# Patient Record
Sex: FEMALE | Race: OTHER | HISPANIC OR LATINO | Employment: OTHER | URBAN - METROPOLITAN AREA
[De-identification: names, ages, dates, MRNs, and addresses within clinical notes are randomized per-mention and may not be internally consistent; named-entity substitution may affect disease eponyms.]

---

## 2018-05-28 ENCOUNTER — HOSPITAL ENCOUNTER (EMERGENCY)
Facility: HOSPITAL | Age: 73
Discharge: HOME | End: 2018-05-28
Attending: EMERGENCY MEDICINE | Admitting: EMERGENCY MEDICINE
Payer: COMMERCIAL

## 2018-05-28 ENCOUNTER — APPOINTMENT (EMERGENCY)
Dept: RADIOLOGY | Facility: HOSPITAL | Age: 73
End: 2018-05-28
Payer: COMMERCIAL

## 2018-05-28 VITALS
DIASTOLIC BLOOD PRESSURE: 80 MMHG | WEIGHT: 178 LBS | OXYGEN SATURATION: 98 % | RESPIRATION RATE: 20 BRPM | TEMPERATURE: 98 F | HEART RATE: 80 BPM | BODY MASS INDEX: 30.39 KG/M2 | SYSTOLIC BLOOD PRESSURE: 180 MMHG | HEIGHT: 64 IN

## 2018-05-28 DIAGNOSIS — G89.29 CHRONIC RIGHT SHOULDER PAIN: Primary | ICD-10-CM

## 2018-05-28 DIAGNOSIS — M25.511 CHRONIC RIGHT SHOULDER PAIN: Primary | ICD-10-CM

## 2018-05-28 PROCEDURE — 73030 X-RAY EXAM OF SHOULDER: CPT

## 2018-05-28 PROCEDURE — 99283 EMERGENCY DEPT VISIT LOW MDM: CPT

## 2018-05-28 RX ORDER — ASPIRIN 81 MG/1
81 TABLET, CHEWABLE ORAL DAILY
COMMUNITY

## 2018-05-28 RX ORDER — LOSARTAN POTASSIUM 50 MG/1
50 TABLET ORAL DAILY
COMMUNITY
End: 2018-10-22 | Stop reason: SDUPTHER

## 2018-05-28 RX ORDER — NAPROXEN 500 MG/1
250 TABLET ORAL 2 TIMES DAILY WITH MEALS
COMMUNITY
End: 2018-08-17

## 2018-05-28 RX ORDER — LEVOTHYROXINE SODIUM 0.1 MG/1
100 TABLET ORAL DAILY
Qty: 30 TABLET | Refills: 0 | Status: SHIPPED | OUTPATIENT
Start: 2018-05-28 | End: 2019-07-03 | Stop reason: SDUPTHER

## 2018-05-28 RX ORDER — BACLOFEN 10 MG/1
5 TABLET ORAL DAILY
COMMUNITY
End: 2018-08-17

## 2018-05-28 RX ORDER — CYCLOBENZAPRINE HCL 5 MG
5 TABLET ORAL 3 TIMES DAILY PRN
Qty: 30 TABLET | Refills: 0 | Status: ON HOLD | OUTPATIENT
Start: 2018-05-28 | End: 2018-09-07 | Stop reason: SDDI

## 2018-05-28 RX ORDER — LEVOTHYROXINE SODIUM 0.1 MG/1
100 TABLET ORAL DAILY
COMMUNITY
End: 2018-05-28

## 2018-05-28 NOTE — DISCHARGE INSTRUCTIONS
Shoulder Sprain   WHAT YOU NEED TO KNOW:   A shoulder sprain happens when a ligament in your shoulder is stretched or torn  Ligaments are the tough tissues that connect bones  Ligaments allow you to lift, lower, and rotate your arm  DISCHARGE INSTRUCTIONS:   Return to the emergency department if:   · You are short of breath  · Your throat feels tight, or you have trouble swallowing  · You feel sudden, sharp chest pain on the same side as your injury  · Your skin feels cold or clammy  Contact your healthcare provider if:   · The skin on your injured shoulder looks blue or pale  · You have new or increased swelling and pain in your shoulder  · You have new or increased stiffness when you move your injured shoulder  · You have questions or concerns about your condition or care  Medicines:   · Prescription pain medicine  may be given  Ask how to take this medicine safely  · Take your medicine as directed  Contact your healthcare provider if you think your medicine is not helping or if you have side effects  Tell him or her if you are allergic to any medicine  Keep a list of the medicines, vitamins, and herbs you take  Include the amounts, and when and why you take them  Bring the list or the pill bottles to follow-up visits  Carry your medicine list with you in case of an emergency  Follow up with your healthcare provider as directed:  Write down your questions so you remember to ask them during your visits  Self-care:   · Rest  your shoulder so it can heal  Avoid moving your shoulder as your injury heals  This will help decrease the risk of more damage to your shoulder  · Apply ice  on your shoulder for 15 to 20 minutes every hour or as directed  Use an ice pack, or put crushed ice in a plastic bag  Cover it with a towel  Ice helps prevent tissue damage and decreases swelling and pain  · Compress your shoulder as directed   Compression provides support and helps decrease swelling and movement so your shoulder can heal  For mild sprains, you may be given a sling to support your arm  You may need a padded brace or a plaster cast to hold your shoulder in place if the sprain is more serious  How to wear a brace, sling, or splint:  A brace, sling, or splint may be needed to limit your movement and protect your injured shoulder  · Wear your brace, sling, or splint as directed  You may need to wear it all the time and take it off only to bathe or do exercises  Ask your healthcare provider how long you should wear it  · Keep your skin clean and dry  Padding under your armpit will help absorb sweat and prevent sores on your skin  · Do not hunch your shoulders  This may cause pain  Keep your shoulders relaxed  · Position the sling over your arm and hand so that it also covers your knuckles  This will help the sling support your wrist and hand  Position your wrist higher than your elbow  Your wrist may start to hurt or go numb if your sling is too short  Exercise your shoulder:  After you rest your shoulder for 3 to 7 days, you will need to do light exercises to decrease shoulder stiffness  Check with your healthcare provider before you return to your normal activities or sports  Prevent another injury:  You can hurt your shoulder again if you stop treatment too soon  The following may decrease your risk for sprains:  · Do not exercise when you are tired or in pain  Warm up and stretch before you exercise  · Wear equipment to protect yourself when you play sports  · Wear shoes that fit well and run on flat surfaces to prevent falls  © 2017 [employer] Information is for End User's use only and may not be sold, redistributed or otherwise used for commercial purposes  All illustrations and images included in CareNotes® are the copyrighted property of [employer]  or [employer]  The above information is an  only   It is not intended as medical advice for individual conditions or treatments  Talk to your doctor, nurse or pharmacist before following any medical regimen to see if it is safe and effective for you
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
## 2018-06-08 ENCOUNTER — OFFICE VISIT (OUTPATIENT)
Dept: OBGYN CLINIC | Facility: CLINIC | Age: 73
End: 2018-06-08
Payer: COMMERCIAL

## 2018-06-08 VITALS
BODY MASS INDEX: 30.56 KG/M2 | SYSTOLIC BLOOD PRESSURE: 147 MMHG | HEIGHT: 64 IN | HEART RATE: 72 BPM | WEIGHT: 179 LBS | DIASTOLIC BLOOD PRESSURE: 84 MMHG

## 2018-06-08 DIAGNOSIS — M75.41 IMPINGEMENT SYNDROME OF RIGHT SHOULDER: Primary | ICD-10-CM

## 2018-06-08 DIAGNOSIS — M25.511 RIGHT SHOULDER PAIN, UNSPECIFIED CHRONICITY: ICD-10-CM

## 2018-06-08 PROCEDURE — 20610 DRAIN/INJ JOINT/BURSA W/O US: CPT | Performed by: PHYSICIAN ASSISTANT

## 2018-06-08 PROCEDURE — 99203 OFFICE O/P NEW LOW 30 MIN: CPT | Performed by: PHYSICIAN ASSISTANT

## 2018-06-08 RX ORDER — LIDOCAINE HYDROCHLORIDE 10 MG/ML
2 INJECTION, SOLUTION INFILTRATION; PERINEURAL
Status: COMPLETED | OUTPATIENT
Start: 2018-06-08 | End: 2018-06-08

## 2018-06-08 RX ORDER — BUPIVACAINE HYDROCHLORIDE 2.5 MG/ML
2 INJECTION, SOLUTION INFILTRATION; PERINEURAL
Status: COMPLETED | OUTPATIENT
Start: 2018-06-08 | End: 2018-06-08

## 2018-06-08 RX ORDER — METHYLPREDNISOLONE ACETATE 40 MG/ML
2 INJECTION, SUSPENSION INTRA-ARTICULAR; INTRALESIONAL; INTRAMUSCULAR; SOFT TISSUE
Status: COMPLETED | OUTPATIENT
Start: 2018-06-08 | End: 2018-06-08

## 2018-06-08 RX ADMIN — METHYLPREDNISOLONE ACETATE 2 ML: 40 INJECTION, SUSPENSION INTRA-ARTICULAR; INTRALESIONAL; INTRAMUSCULAR; SOFT TISSUE at 10:51

## 2018-06-08 RX ADMIN — BUPIVACAINE HYDROCHLORIDE 2 ML: 2.5 INJECTION, SOLUTION INFILTRATION; PERINEURAL at 10:51

## 2018-06-08 RX ADMIN — LIDOCAINE HYDROCHLORIDE 2 ML: 10 INJECTION, SOLUTION INFILTRATION; PERINEURAL at 10:51

## 2018-06-08 NOTE — PROGRESS NOTES
_____________________________________________________  CHIEF COMPLAINT:  Chief Complaint   Patient presents with    Right Shoulder - Pain         SUBJECTIVE:  Jose Diamond is a 71y o  year old female who presents right shoulder pain  She states it has been going on for several years  She recently came from [facility] to help in treatment for her right shoulder pain  Her son was present to help with translation  She states she takes occasional anti-inflammatories with minimal relief  She has not done any physical therapy or had cortisone injections  She denies any numbness or tingling  She denies any constitutional signs or symptoms  She states that her pain is more so with overhead use when reaching out into space  PAST MEDICAL HISTORY:  History reviewed  No pertinent past medical history  PAST SURGICAL HISTORY:  Past Surgical History:   Procedure Laterality Date    APPENDECTOMY      CHOLECYSTECTOMY      EYE SURGERY      KNEE SURGERY      THYROID SURGERY         FAMILY HISTORY:  History reviewed  No pertinent family history      SOCIAL HISTORY:  Social History   Substance Use Topics    Smoking status: Never Smoker    Smokeless tobacco: Never Used    Alcohol use No       MEDICATIONS:    Current Outpatient Prescriptions:     aspirin 81 mg chewable tablet, Chew 81 mg daily, Disp: , Rfl:     baclofen 10 mg tablet, Take 5 mg by mouth daily, Disp: , Rfl:     cyclobenzaprine (FLEXERIL) 5 mg tablet, Take 1 tablet (5 mg total) by mouth 3 (three) times a day as needed for muscle spasms for up to 30 doses, Disp: 30 tablet, Rfl: 0    diclofenac sodium (VOLTAREN) 1 %, Apply 2 g topically 4 (four) times a day for 30 days, Disp: 1 Tube, Rfl: 0    insulin NPH-insulin regular (NovoLIN 70/30) 100 units/mL subcutaneous injection, Inject 40 Units under the skin daily before breakfast, Disp: 10 mL, Rfl: 0    insulin NPH-insulin regular (NovoLIN 70/30) 100 units/mL subcutaneous injection, Inject 20 Units under the skin daily after lunch, Disp: 10 mL, Rfl: 0    levothyroxine 100 mcg tablet, Take 1 tablet (100 mcg total) by mouth daily, Disp: 30 tablet, Rfl: 0    losartan (COZAAR) 50 mg tablet, Take 50 mg by mouth daily, Disp: , Rfl:     metFORMIN (GLUCOPHAGE) 500 mg tablet, Take 1,000 mg by mouth 2 (two) times a day with meals, Disp: , Rfl:     naproxen (NAPROSYN) 500 mg tablet, Take 250 mg by mouth 2 (two) times a day with meals, Disp: , Rfl:     ALLERGIES:  No Known Allergies    REVIEW OF SYSTEMS:  General: no fever, no chills  HEENT:  No loss of hearing or eyesight problems  Eyes:  No red eyes  Respiratory:  No coughing, shortness of breath or wheezing  Cardiovascular:  No chest pain, no palpitations  GI:  Abdomen soft nontender, denies nausea  Endocrine:  No muscle weakness, no frequent urination, no excessive thirst  Urinary:  No dysuria, no incontinence  Musculoskeletal: see HPI and PE  SKIN:  No skin rash, no dry skin  Neurological:  No headaches, no confusion  Psychiatric:  No suicide thoughts, no anxiety, no depression  Review of all other systems is negative    LABS:  HgA1c: No results found for: HGBA1C  BMP: No results found for: GLUCOSE, CALCIUM, NA, K, CO2, CL, BUN, CREATININE    _____________________________________________________  PHYSICAL EXAMINATION:  General: well developed and well nourished, alert, oriented times 3 and appears comfortable  Psychiatric: Normal  HEENT: Trachea Midline, No torticollis  Cardiovascular: No discernable arrhythmia  Pulmonary: No wheezing or stridor  Skin: No masses, erthema, lacerations, fluctation, ulcerations  Neurovascular: Sensation Intact to the Median, Ulnar, Radial Nerve, Motor Intact to the Median, Ulnar, Radial Nerve and Pulses Intact    MUSCULOSKELETAL EXAMINATION:  Right shoulder: Active flexion 180° abduction 180° external rotation 90° internal rotation T7  5/5 strength in all ranges of motion   Negative empty can test negative [place] postive Katlyn Goes positive cross-body negative belly press test   Patient is neurovascularly intact  Left shoulder: Active flexion abduction external and internal rotation normal passive flexion abduction external rotation and internal rotation normal  5/5 strength in all ranges of motion  Negative empty can test negative [place] negative Katlyn Goes negative cross-body negative belly press test   Patient is neurovascularly intact     _____________________________________________________  STUDIES REVIEWED:  X-rays demonstrated a a subacromial spur  There is slight rounding of the humeral head  No evidence of acute fractures  ASSESSMENT/PLAN:    Diagnoses and all orders for this visit:    Impingement syndrome of right shoulder  -     Ambulatory referral to Physical Therapy; Future    Right shoulder pain, unspecified chronicity        Assessment:   Right shoulder impingement syndrome  Plan:   Patient was given a cortisone injection today into the subacromial space  She tolerated the procedure well  Will have her start a course of physical therapy to work on shoulder range of motion and strengthening  We will have her follow up in 6 weeks to see how the injection physical therapy did    If we notice there is still continued pain we can order an MRI to rule out rotator cuff tear    Follow Up:  6 weeks    PROCEDURES PERFORMED:  Large joint arthrocentesis  Date/Time: 6/8/2018 10:51 AM  Consent given by: patient  Site marked: site marked  Timeout: Immediately prior to procedure a time out was called to verify the correct patient, procedure, equipment, support staff and site/side marked as required   Supporting Documentation  Indications: pain   Procedure Details  Location: shoulder - R subacromial bursa  Preparation: Patient was prepped and draped in the usual sterile fashion  Needle size: 22 G  Approach: posterior  Medications administered: 2 mL bupivacaine 0 25 %; 2 mL lidocaine 1 %; 2 mL methylPREDNISolone acetate 40 mg/mL    Patient tolerance: patient tolerated the procedure well with no immediate complications  Dressing:  Sterile dressing applied

## 2018-07-12 ENCOUNTER — TELEPHONE (OUTPATIENT)
Dept: OBGYN CLINIC | Facility: HOSPITAL | Age: 73
End: 2018-07-12

## 2018-07-12 NOTE — TELEPHONE ENCOUNTER
Ranjana Crump CH-PT sent plan of care 6/14 and has not received it back  Will resend please watch for it  Fax [phone]     [phone]`

## 2018-07-13 NOTE — TELEPHONE ENCOUNTER
I received the plan of care  I signed it  It should be faxed later today  Thank you      Leanna Tolliver

## 2018-07-18 ENCOUNTER — OFFICE VISIT (OUTPATIENT)
Dept: OBGYN CLINIC | Facility: CLINIC | Age: 73
End: 2018-07-18
Payer: MEDICARE

## 2018-07-18 VITALS
SYSTOLIC BLOOD PRESSURE: 138 MMHG | WEIGHT: 178.8 LBS | HEART RATE: 80 BPM | BODY MASS INDEX: 30.52 KG/M2 | DIASTOLIC BLOOD PRESSURE: 75 MMHG | HEIGHT: 64 IN

## 2018-07-18 DIAGNOSIS — M25.511 RIGHT SHOULDER PAIN, UNSPECIFIED CHRONICITY: Primary | ICD-10-CM

## 2018-07-18 PROCEDURE — 99213 OFFICE O/P EST LOW 20 MIN: CPT | Performed by: ORTHOPAEDIC SURGERY

## 2018-07-18 NOTE — PROGRESS NOTES
_____________________________________________________  CHIEF COMPLAINT:  Chief Complaint   Patient presents with    Right Shoulder - Follow-up         SUBJECTIVE:  Kristen Diamond is a 71y o  year old female who presents right shoulder pain  Patient had a cortisone injection and tried physical therapy  She states there was minimal relief from both  She states the injection worked for about a day and she hand pain again  She continues to have pain with overhead activities  She denies any numbness or tingling  She denies any constitutional signs or symptoms  PAST MEDICAL HISTORY:  History reviewed  No pertinent past medical history      PAST SURGICAL HISTORY:  Past Surgical History:   Procedure Laterality Date    APPENDECTOMY      CHOLECYSTECTOMY      EYE SURGERY      KNEE SURGERY      THYROID SURGERY         FAMILY HISTORY:  Family History   Problem Relation Age of Onset    Heart disease Mother     Cancer Father     Heart disease Father     Diabetes Brother        SOCIAL HISTORY:  Social History   Substance Use Topics    Smoking status: Never Smoker    Smokeless tobacco: Never Used    Alcohol use No       MEDICATIONS:    Current Outpatient Prescriptions:     aspirin 81 mg chewable tablet, Chew 81 mg daily, Disp: , Rfl:     baclofen 10 mg tablet, Take 5 mg by mouth daily, Disp: , Rfl:     cyclobenzaprine (FLEXERIL) 5 mg tablet, Take 1 tablet (5 mg total) by mouth 3 (three) times a day as needed for muscle spasms for up to 30 doses, Disp: 30 tablet, Rfl: 0    insulin NPH-insulin regular (NovoLIN 70/30) 100 units/mL subcutaneous injection, Inject 40 Units under the skin daily before breakfast, Disp: 10 mL, Rfl: 0    levothyroxine 100 mcg tablet, Take 1 tablet (100 mcg total) by mouth daily, Disp: 30 tablet, Rfl: 0    losartan (COZAAR) 50 mg tablet, Take 50 mg by mouth daily, Disp: , Rfl:     metFORMIN (GLUCOPHAGE) 500 mg tablet, Take 1,000 mg by mouth 2 (two) times a day with meals, Disp: , Rfl:     naproxen (NAPROSYN) 500 mg tablet, Take 250 mg by mouth 2 (two) times a day with meals, Disp: , Rfl:     diclofenac sodium (VOLTAREN) 1 %, Apply 2 g topically 4 (four) times a day for 30 days, Disp: 1 Tube, Rfl: 0    ALLERGIES:  No Known Allergies    REVIEW OF SYSTEMS:  General: no fever, no chills  HEENT:  No loss of hearing or eyesight problems  Eyes:  No red eyes  Respiratory:  No coughing, shortness of breath or wheezing  Cardiovascular:  No chest pain, no palpitations  GI:  Abdomen soft nontender, denies nausea  Endocrine:  No muscle weakness, no frequent urination, no excessive thirst  Urinary:  No dysuria, no incontinence  Musculoskeletal:  Positive for joint pain  SKIN:  No skin rash, no dry skin  Neurological:  No headaches, no confusion  Psychiatric:  No suicide thoughts, no anxiety, no depression  Review of all other systems is negative    LABS:  HgA1c: No results found for: HGBA1C  BMP: No results found for: GLUCOSE, CALCIUM, NA, K, CO2, CL, BUN, CREATININE    _____________________________________________________  PHYSICAL EXAMINATION:  General: well developed and well nourished, alert, oriented times 3 and appears comfortable  Psychiatric: Normal  HEENT: Trachea Midline, No torticollis  Cardiovascular: No discernable arrhythmia  Pulmonary: No wheezing or stridor  Skin: No masses, erthema, lacerations, fluctation, ulcerations  Neurovascular: Sensation Intact to the Median, Ulnar, Radial Nerve, Motor Intact to the Median, Ulnar, Radial Nerve and Pulses Intact    MUSCULOSKELETAL EXAMINATION:  Right shoulder: Active flexion 180° abduction 180° external rotation 90° internal rotation T7  5/5 strength in all ranges of motion   Positive empty can test negative [place] postive Wendy Cameron positive cross-body negative belly press test   Patient is neurovascularly intact     _____________________________________________________  STUDIES REVIEWED:  None      ASSESSMENT/PLAN:    Diagnoses and all orders for this visit:    Right shoulder pain, unspecified chronicity  -     MRI shoulder right wo contrast; Future        Assessment:   Impingement syndrome right shoulder    Plan:   Patient failed cortisone injection and physical therapy  We will order an MRI of her right shoulder to rule out a rotator cuff tear  We will have her follow up after the MRI to go over test results and treatment options  Follow Up:   After MRI    PROCEDURES PERFORMED:  None

## 2018-07-21 ENCOUNTER — APPOINTMENT (EMERGENCY)
Dept: CT IMAGING | Facility: HOSPITAL | Age: 73
End: 2018-07-21
Payer: MEDICARE

## 2018-07-21 ENCOUNTER — HOSPITAL ENCOUNTER (EMERGENCY)
Facility: HOSPITAL | Age: 73
Discharge: HOME | End: 2018-07-21
Attending: EMERGENCY MEDICINE
Payer: MEDICARE

## 2018-07-21 VITALS
WEIGHT: 182.54 LBS | HEART RATE: 65 BPM | RESPIRATION RATE: 17 BRPM | DIASTOLIC BLOOD PRESSURE: 58 MMHG | HEIGHT: 64 IN | BODY MASS INDEX: 31.16 KG/M2 | SYSTOLIC BLOOD PRESSURE: 123 MMHG | OXYGEN SATURATION: 95 % | TEMPERATURE: 98 F

## 2018-07-21 DIAGNOSIS — M25.511 RIGHT SHOULDER PAIN: Primary | ICD-10-CM

## 2018-07-21 LAB
ALBUMIN SERPL BCP-MCNC: 3.2 G/DL (ref 3.5–5)
ALP SERPL-CCNC: 58 U/L (ref 46–116)
ALT SERPL W P-5'-P-CCNC: 31 U/L (ref 12–78)
ANION GAP SERPL CALCULATED.3IONS-SCNC: 5 MMOL/L (ref 4–13)
AST SERPL W P-5'-P-CCNC: 20 U/L (ref 5–45)
BACTERIA UR QL AUTO: ABNORMAL /HPF
BASOPHILS # BLD AUTO: 0.02 THOUSANDS/ΜL (ref 0–0.1)
BASOPHILS NFR BLD AUTO: 0 % (ref 0–1)
BILIRUB SERPL-MCNC: 0.4 MG/DL (ref 0.2–1)
BILIRUB UR QL STRIP: NEGATIVE
BUN SERPL-MCNC: 11 MG/DL (ref 5–25)
CALCIUM SERPL-MCNC: 8.1 MG/DL (ref 8.3–10.1)
CHLORIDE SERPL-SCNC: 105 MMOL/L (ref 100–108)
CLARITY UR: CLEAR
CO2 SERPL-SCNC: 30 MMOL/L (ref 21–32)
COLOR UR: YELLOW
CREAT SERPL-MCNC: 0.87 MG/DL (ref 0.6–1.3)
DEPRECATED D DIMER PPP: 1167 NG/ML (FEU) (ref 0–424)
EOSINOPHIL # BLD AUTO: 0.14 THOUSAND/ΜL (ref 0–0.61)
EOSINOPHIL NFR BLD AUTO: 2 % (ref 0–6)
ERYTHROCYTE [DISTWIDTH] IN BLOOD BY AUTOMATED COUNT: 15 % (ref 11.6–15.1)
GFR SERPL CREATININE-BSD FRML MDRD: 67 ML/MIN/1.73SQ M
GLUCOSE SERPL-MCNC: 72 MG/DL (ref 65–140)
GLUCOSE UR STRIP-MCNC: NEGATIVE MG/DL
HCT VFR BLD AUTO: 34.9 % (ref 34.8–46.1)
HGB BLD-MCNC: 11.4 G/DL (ref 11.5–15.4)
HGB UR QL STRIP.AUTO: ABNORMAL
IMM GRANULOCYTES # BLD AUTO: 0.05 THOUSAND/UL (ref 0–0.2)
IMM GRANULOCYTES NFR BLD AUTO: 1 % (ref 0–2)
KETONES UR STRIP-MCNC: NEGATIVE MG/DL
LEUKOCYTE ESTERASE UR QL STRIP: NEGATIVE
LIPASE SERPL-CCNC: 212 U/L (ref 73–393)
LYMPHOCYTES # BLD AUTO: 1.59 THOUSANDS/ΜL (ref 0.6–4.47)
LYMPHOCYTES NFR BLD AUTO: 27 % (ref 14–44)
MCH RBC QN AUTO: 29 PG (ref 26.8–34.3)
MCHC RBC AUTO-ENTMCNC: 32.7 G/DL (ref 31.4–37.4)
MCV RBC AUTO: 89 FL (ref 82–98)
MONOCYTES # BLD AUTO: 0.49 THOUSAND/ΜL (ref 0.17–1.22)
MONOCYTES NFR BLD AUTO: 8 % (ref 4–12)
NEUTROPHILS # BLD AUTO: 3.6 THOUSANDS/ΜL (ref 1.85–7.62)
NEUTS SEG NFR BLD AUTO: 62 % (ref 43–75)
NITRITE UR QL STRIP: NEGATIVE
NON-SQ EPI CELLS URNS QL MICRO: ABNORMAL /HPF
NRBC BLD AUTO-RTO: 0 /100 WBCS
PH UR STRIP.AUTO: 7 [PH] (ref 4.5–8)
PLATELET # BLD AUTO: 265 THOUSANDS/UL (ref 149–390)
PMV BLD AUTO: 9 FL (ref 8.9–12.7)
POTASSIUM SERPL-SCNC: 3.6 MMOL/L (ref 3.5–5.3)
PROT SERPL-MCNC: 7.3 G/DL (ref 6.4–8.2)
PROT UR STRIP-MCNC: NEGATIVE MG/DL
RBC # BLD AUTO: 3.93 MILLION/UL (ref 3.81–5.12)
RBC #/AREA URNS AUTO: ABNORMAL /HPF
SODIUM SERPL-SCNC: 140 MMOL/L (ref 136–145)
SP GR UR STRIP.AUTO: 1.01 (ref 1–1.03)
TROPONIN I SERPL-MCNC: <0.02 NG/ML
TROPONIN I SERPL-MCNC: <0.02 NG/ML
UROBILINOGEN UR QL STRIP.AUTO: 1 E.U./DL
WBC # BLD AUTO: 5.89 THOUSAND/UL (ref 4.31–10.16)
WBC #/AREA URNS AUTO: ABNORMAL /HPF

## 2018-07-21 PROCEDURE — 81001 URINALYSIS AUTO W/SCOPE: CPT | Performed by: EMERGENCY MEDICINE

## 2018-07-21 PROCEDURE — 83690 ASSAY OF LIPASE: CPT | Performed by: EMERGENCY MEDICINE

## 2018-07-21 PROCEDURE — 93005 ELECTROCARDIOGRAM TRACING: CPT

## 2018-07-21 PROCEDURE — 96360 HYDRATION IV INFUSION INIT: CPT

## 2018-07-21 PROCEDURE — 96361 HYDRATE IV INFUSION ADD-ON: CPT

## 2018-07-21 PROCEDURE — 80053 COMPREHEN METABOLIC PANEL: CPT | Performed by: EMERGENCY MEDICINE

## 2018-07-21 PROCEDURE — 85379 FIBRIN DEGRADATION QUANT: CPT | Performed by: EMERGENCY MEDICINE

## 2018-07-21 PROCEDURE — 71275 CT ANGIOGRAPHY CHEST: CPT

## 2018-07-21 PROCEDURE — 85025 COMPLETE CBC W/AUTO DIFF WBC: CPT | Performed by: EMERGENCY MEDICINE

## 2018-07-21 PROCEDURE — 99284 EMERGENCY DEPT VISIT MOD MDM: CPT

## 2018-07-21 PROCEDURE — 84484 ASSAY OF TROPONIN QUANT: CPT | Performed by: EMERGENCY MEDICINE

## 2018-07-21 PROCEDURE — 36415 COLL VENOUS BLD VENIPUNCTURE: CPT | Performed by: EMERGENCY MEDICINE

## 2018-07-21 PROCEDURE — 74177 CT ABD & PELVIS W/CONTRAST: CPT

## 2018-07-21 RX ADMIN — IOHEXOL 100 ML: 350 INJECTION, SOLUTION INTRAVENOUS at 01:37

## 2018-07-21 RX ADMIN — SODIUM CHLORIDE 1000 ML: 0.9 INJECTION, SOLUTION INTRAVENOUS at 01:44

## 2018-07-21 NOTE — DISCHARGE INSTRUCTIONS
Shoulder Pain, Ambulatory Care   GENERAL INFORMATION:   Shoulder pain  is a common problem and can affect your daily activities  Pain can be caused by a problem within your shoulder  Shoulder pain may also be caused by pain that spreads to your shoulder from another part of your body  Seek immediate care for the following symptoms:   · Severe pain    · Inability to move your arm or shoulder    · Numbness or tingling in your shoulder or arm  Treatment for shoulder pain  may include any of the following:  · Acetaminophen  decreases pain and fever  It is available without a doctor's order  Ask how much to take and how often to take it  Follow directions  Acetaminophen can cause liver damage if not taken correctly  · NSAIDs  help decrease swelling and pain or fever  This medicine is available with or without a doctor's order  NSAIDs can cause stomach bleeding or kidney problems in certain people  If you take blood thinner medicine, always ask your healthcare provider if NSAIDs are safe for you  Always read the medicine label and follow directions  · A steroid injection  may help decrease pain and swelling  · Surgery  may be needed for long-term pain and loss of function  Manage your symptoms:   · Apply ice  on your shoulder for 20 to 30 minutes every 2 hours or as directed  Use an ice pack, or put crushed ice in a plastic bag  Cover it with a towel  Ice helps prevent tissue damage and decreases swelling and pain  · Apply heat if ice does not help your symptoms  Apply heat on your shoulder for 20 to 30 minutes every 2 hours for as many days as directed  Heat helps decrease pain and muscle spasms  · Go to physical or occupational therapy as directed  A physical therapist teaches you exercises to help improve movement and strength, and to decrease pain  An occupational therapist teaches you skills to help with your daily activities  Prevent shoulder pain:   · Stretch and strengthen your shoulder  Use proper technique during exercises and sports  · Limit activities as directed  Try to avoid repeated overhead movements  Follow up with your healthcare provider or orthopedist as directed:  Write down your questions so you remember to ask them during your visits  CARE AGREEMENT:   You have the right to help plan your care  Learn about your health condition and how it may be treated  Discuss treatment options with your caregivers to decide what care you want to receive  You always have the right to refuse treatment  The above information is an  only  It is not intended as medical advice for individual conditions or treatments  Talk to your doctor, nurse or pharmacist before following any medical regimen to see if it is safe and effective for you  © 2014 [address] is for End User's use only and may not be sold, redistributed or otherwise used for commercial purposes  All illustrations and images included in CareNotes® are the copyrighted property of [employer] , Inc  or Jovanni Raymond  Shoulder Pain, Ambulatory Care   [facility] & [facility]: Approach to Shoulder  In: Reynaldo MILTON, [facility], [facility] Primary Care Orthopaedics, 2nd ed  [address] [facility],[address] [place] [address], 2014  [facility] & [facility]: Shoulder Pain  In: Chava FJ, ed  The 5-Minute Clinical Consult 2014, 22nd ed  [address] [facility],[address] [place] [address], 2014  [place]ogo [place] DL: Evaluation and treatment of shoulder pain  Med Clin [place] 2014; 26(2):622-967  [facility] & Vikas TS: Shoulder Pain  In: Norman Alarcon, Jesus et al, eds  Suzette's Textbook of Rheumatology, 9th ed  [address] , [place], [place], 2013  Kaela EM & Claudetta Anda R: Shoulder pain at the workplace  Best Pract Res Clin Rheumatol 2011; 25(1):59-68  Darion BRITO: Shoulder pain: pathogenesis, diagnosis and management  [employer] Stand 2014; 39(89):27-70    © 2014 [employer] [address] [place] [address] is for End User's use only and may not be sold, redistributed or otherwise used for commercial purposes  All illustrations and images included in CareNotes® are the copyrighted property of A D A M , Inc  or Jovanni Raymond  The above information is an  only  It is not intended as medical advice for individual conditions or treatments  Talk to your doctor, nurse or pharmacist before following any medical regimen to see if it is safe and effective for you

## 2018-07-21 NOTE — PROGRESS NOTES
Attempted to call, voicemail is full  Will send letter  Needs to be told about her CT findings of small mass noted  Needs to follow up with PCP

## 2018-07-21 NOTE — ED PROVIDER NOTES
History  Chief Complaint   Patient presents with    Flank Pain     pt came in for right flank pain; states it hurts when she breaths     HPI    Prior to Admission Medications   Prescriptions Last Dose Informant Patient Reported? Taking?   aspirin 81 mg chewable tablet  Self Yes No   Sig: Chew 81 mg daily   baclofen 10 mg tablet  Self Yes No   Sig: Take 5 mg by mouth daily   cyclobenzaprine (FLEXERIL) 5 mg tablet  Self No No   Sig: Take 1 tablet (5 mg total) by mouth 3 (three) times a day as needed for muscle spasms for up to 30 doses   diclofenac sodium (VOLTAREN) 1 %   No No   Sig: Apply 2 g topically 4 (four) times a day for 30 days   insulin NPH-insulin regular (NovoLIN 70/30) 100 units/mL subcutaneous injection  Self No No   Sig: Inject 40 Units under the skin daily before breakfast   levothyroxine 100 mcg tablet  Self No No   Sig: Take 1 tablet (100 mcg total) by mouth daily   losartan (COZAAR) 50 mg tablet  Self Yes No   Sig: Take 50 mg by mouth daily   metFORMIN (GLUCOPHAGE) 500 mg tablet  Self Yes No   Sig: Take 1,000 mg by mouth 2 (two) times a day with meals   naproxen (NAPROSYN) 500 mg tablet  Self Yes No   Sig: Take 250 mg by mouth 2 (two) times a day with meals      Facility-Administered Medications: None       History reviewed  No pertinent past medical history  Past Surgical History:   Procedure Laterality Date    APPENDECTOMY      CHOLECYSTECTOMY      EYE SURGERY      KNEE SURGERY      THYROID SURGERY         Family History   Problem Relation Age of Onset    Heart disease Mother     Cancer Father     Heart disease Father     Diabetes Brother      I have reviewed and agree with the history as documented      Social History   Substance Use Topics    Smoking status: Never Smoker    Smokeless tobacco: Never Used    Alcohol use No        Review of Systems    Physical Exam  Physical Exam    Vital Signs  ED Triage Vitals   Temperature Pulse Respirations Blood Pressure SpO2   07/21/18 0011 07/21/18 0005 07/21/18 0005 07/21/18 0005 07/21/18 0005   98 1 °F (36 7 °C) 79 18 (!) 184/81 94 %      Temp src Heart Rate Source Patient Position - Orthostatic VS BP Location FiO2 (%)   -- 07/21/18 0005 07/21/18 0005 07/21/18 0005 --    Monitor Lying Right arm       Pain Score       --                  Vitals:    07/21/18 0005   BP: (!) 184/81   Pulse: 79   Patient Position - Orthostatic VS: Lying       Visual Acuity      ED Medications  Medications - No data to display    Diagnostic Studies  Results Reviewed     Procedure Component Value Units Date/Time    Comprehensive metabolic panel [[id number]]  (Abnormal) Collected:  07/21/18 0034    Lab Status:  Final result Specimen:  Blood from Arm, Right Updated:  07/21/18 0058     Sodium 140 mmol/L      Potassium 3 6 mmol/L      Chloride 105 mmol/L      CO2 30 mmol/L      Anion Gap 5 mmol/L      BUN 11 mg/dL      Creatinine 0 87 mg/dL      Glucose 72 mg/dL      Calcium 8 1 (L) mg/dL      AST 20 U/L      ALT 31 U/L      Alkaline Phosphatase 58 U/L      Total Protein 7 3 g/dL      Albumin 3 2 (L) g/dL      Total Bilirubin 0 40 mg/dL      eGFR 67 ml/min/1 73sq m     Narrative:         National Kidney Disease Education Program recommendations are as follows:  GFR calculation is accurate only with a steady state creatinine  Chronic Kidney disease less than 60 ml/min/1 73 sq  meters  Kidney failure less than 15 ml/min/1 73 sq  meters      Lipase [[id number]]  (Normal) Collected:  07/21/18 0034    Lab Status:  Final result Specimen:  Blood from Arm, Right Updated:  07/21/18 0058     Lipase 212 u/L     CBC and differential [[id number]]  (Abnormal) Collected:  07/21/18 0034    Lab Status:  Final result Specimen:  Blood from Arm, Right Updated:  07/21/18 0041     WBC 5 89 Thousand/uL      RBC 3 93 Million/uL      Hemoglobin 11 4 (L) g/dL      Hematocrit 34 9 %      MCV 89 fL      MCH 29 0 pg      MCHC 32 7 g/dL      RDW 15 0 %      MPV 9 0 fL      Platelets 804 Thousands/uL      nRBC 0 /100 WBCs      Neutrophils Relative 62 %      Immat GRANS % 1 %      Lymphocytes Relative 27 %      Monocytes Relative 8 %      Eosinophils Relative 2 %      Basophils Relative 0 %      Neutrophils Absolute 3 60 Thousands/µL      Immature Grans Absolute 0 05 Thousand/uL      Lymphocytes Absolute 1 59 Thousands/µL      Monocytes Absolute 0 49 Thousand/µL      Eosinophils Absolute 0 14 Thousand/µL      Basophils Absolute 0 02 Thousands/µL                  No orders to display              Procedures  Procedures       Phone Contacts  ED Phone Contact    ED Course                               MDM  CritCare Time    Disposition  Final diagnoses:   None     ED Disposition     None      Follow-up Information    None         Patient's Medications   Discharge Prescriptions    No medications on file     No discharge procedures on file      ED Provider  Electronically Signed by

## 2018-07-22 LAB
ATRIAL RATE: 73 BPM
ATRIAL RATE: 76 BPM
P AXIS: 55 DEGREES
P AXIS: 63 DEGREES
PR INTERVAL: 164 MS
PR INTERVAL: 176 MS
QRS AXIS: 45 DEGREES
QRS AXIS: 50 DEGREES
QRSD INTERVAL: 88 MS
QRSD INTERVAL: 88 MS
QT INTERVAL: 400 MS
QT INTERVAL: 426 MS
QTC INTERVAL: 450 MS
QTC INTERVAL: 469 MS
T WAVE AXIS: 27 DEGREES
T WAVE AXIS: 35 DEGREES
VENTRICULAR RATE: 73 BPM
VENTRICULAR RATE: 76 BPM

## 2018-07-22 PROCEDURE — 93010 ELECTROCARDIOGRAM REPORT: CPT | Performed by: INTERNAL MEDICINE

## 2018-07-27 ENCOUNTER — HOSPITAL ENCOUNTER (OUTPATIENT)
Dept: MRI IMAGING | Facility: CLINIC | Age: 73
Discharge: HOME | End: 2018-07-27
Payer: MEDICARE

## 2018-07-27 ENCOUNTER — TELEPHONE (OUTPATIENT)
Dept: EMERGENCY DEPT | Facility: HOSPITAL | Age: 73
End: 2018-07-27

## 2018-07-27 DIAGNOSIS — M25.511 RIGHT SHOULDER PAIN, UNSPECIFIED CHRONICITY: ICD-10-CM

## 2018-07-27 PROCEDURE — 73221 MRI JOINT UPR EXTREM W/O DYE: CPT

## 2018-08-08 ENCOUNTER — TELEPHONE (OUTPATIENT)
Dept: CARDIOLOGY CLINIC | Facility: CLINIC | Age: 73
End: 2018-08-08

## 2018-08-08 ENCOUNTER — OFFICE VISIT (OUTPATIENT)
Dept: CARDIAC SURGERY | Facility: CLINIC | Age: 73
End: 2018-08-08
Payer: COMMERCIAL

## 2018-08-08 VITALS
HEIGHT: 64 IN | DIASTOLIC BLOOD PRESSURE: 79 MMHG | BODY MASS INDEX: 30.32 KG/M2 | OXYGEN SATURATION: 97 % | HEART RATE: 75 BPM | TEMPERATURE: 97.3 F | SYSTOLIC BLOOD PRESSURE: 185 MMHG | WEIGHT: 177.6 LBS

## 2018-08-08 DIAGNOSIS — J98.59 MEDIASTINAL MASS: Primary | ICD-10-CM

## 2018-08-08 PROCEDURE — 99205 OFFICE O/P NEW HI 60 MIN: CPT | Performed by: THORACIC SURGERY (CARDIOTHORACIC VASCULAR SURGERY)

## 2018-08-08 NOTE — LETTER
August 8, 2018     Viraj Smith, [address] [address]    Patient: Dakota Vargas   YOB: 1945   Date of Visit: 8/8/2018       Dear Dr Tyesha Haq: Thank you for referring Jimbo Erickson to me for evaluation  Below are my notes for this consultation  In short, she has a 3 5 x 2 5 cm anterior mediastinal mass that likely represents a thymoma  She has a myriad of other medical issues that she is currently dealing with including bilateral cataract surgery, right shoulder surgery and questionable cardiac issues  I am obtaining lab work, pulmonary function testing and referring her to a cardiologist   I will see her back in my office following these and discuss surgical resection of this anterior mediastinal mass  I would plan on doing this robotically with a 1-2 night hospital stay and a 2-3 week overall recovery  If you have questions, please do not hesitate to call me  I look forward to following your patient along with you  Sincerely,        Layla Shaikh MD        CC: No Recipients  Layla Shaikh MD  8/8/2018 11:34 AM  Sign at close encounter  Thoracic Consult  Assessment/Plan:   20-year-old female with 3 5 x 2 4 x 2 5 cm anterior mediastinal mass, likely thymoma  This is a well incapsulated, homogenous, anterior mediastinal mass that has not invaded into any surrounding structures based on her imaging  Although relatively indolent in nature, this has the potential to grow and spread via invasion into surrounding tissues  At this time this appears to be a thymoma and is potentially completely resectable  She has a multitude of other medical issues that we are trying to deal with concurrently, including, bilateral cataract surgery, right shoulder surgery, and Cardiology evaluation    I have discussed with the patient and her family extensively that this is not an emergent procedure but should be addressed in the coming months  We had preliminary discussions about surgical resection  We would plan on performing a robotic assisted thymectomy via small focal incisions  I would anticipate her staying in the hospital for 1-2 nights  She should be almost completely recovered 2-3 weeks postoperatively  Prior to her undergoing surgery I would like her to obtain pulmonary function testing, laboratory evaluation including acetylcholine receptor antibodies, and a cardiology evaluation  We will see her back in the office following all these and potentially schedule her for surgery at that time  We are looking tentatively at early to mid September for surgery  Diagnoses and all orders for this visit:    Mediastinal mass  -     Complete pulmonary function test; Future  -     Acetylcholine Receptor Ab, All; Future  -     Ambulatory referral to Cardiology; Future  -     CBC and differential; Future  -     Basic metabolic panel; Future  -     Type and screen; Future  -     Protime-INR; Future  -     APTT; Future            Thoracic History   Diagnosis:    Procedures/Surgeries:    Pathology:    Adjuvant Therapy:       Subjective:    Patient ID: Estrella Duron is a 71 y o  female  HPI  Abbey Miller is a 13-year-old [place] female, Slovak-speaking only, with past medical surgery of diabetes mellitus on insulin, hypertension, hyperlipidemia and recent right shoulder issues who was incidentally found to have a an anterior mediastinal mass  She came from [facility] on vacation to visit family and has been dealing with a multitude of medical issues since that time  She has had significant right shoulder issues for which she has been seen in the ER and seeing Orthopedic surgery  She presented to the ER in July with complaints of shortness of breath  She was found to have a 3 5 x 2 4 cm anterior mediastinal mass on a CT scan at that time  She follows up in our office for that    She comes accompanied by her son, daughter, and daughter-in-law to help translate  She only complains of mild shortness of breath  She denies any dysphagia  She denies any significant chest pain  She is very active around the house cooking, cleaning and has no issues with stairs  She does note some increasing fatigue of unclear etiology  No significant weight changes  No unexplained fevers or chills  Her vision has been declining for a number of years and she complains mostly of floaters  She has bilateral cataract surgery scheduled for the coming weeks  The following portions of the patient's history were reviewed and updated as appropriate: allergies, current medications, past family history, past medical history, past social history, past surgical history and problem list     Past Medical History:   Diagnosis Date    Diabetes mellitus ([address] )     Hyperlipidemia     Hypertension     Mediastinal mass     Right shoulder pain       Past Surgical History:   Procedure Laterality Date    APPENDECTOMY      CHOLECYSTECTOMY      EYE SURGERY      KNEE SURGERY      THYROID SURGERY        Family History   Problem Relation Age of Onset    Heart disease Mother     Cancer Father     Heart disease Father     Diabetes Brother       Social History     Social History    Marital status: /Civil Union     Spouse name: N/A    Number of children: N/A    Years of education: N/A     Occupational History    Not on file  Social History Main Topics    Smoking status: Former Smoker     Packs/day: 0 50     Years: 20 00     Quit date: 1987    Smokeless tobacco: Never Used    Alcohol use No    Drug use: No    Sexual activity: Not on file     Other Topics Concern    Not on file     Social History Narrative    No narrative on file      Review of Systems   Constitutional: Positive for fatigue  Negative for activity change, appetite change, chills, diaphoresis, fever and unexpected weight change  HENT: Negative      Eyes: Positive for visual disturbance  Complains of "floaters" bilaterally     Respiratory: Negative for apnea, cough, chest tightness, shortness of breath, wheezing and stridor  Cardiovascular: Negative for chest pain, palpitations and leg swelling  Gastrointestinal: Positive for abdominal distention  Negative for abdominal pain, blood in stool, constipation, diarrhea, nausea and vomiting  Endocrine: Negative  Genitourinary: Negative  Musculoskeletal: Negative  Skin: Negative  Allergic/Immunologic: Negative  Neurological: Negative  Hematological: Negative  Psychiatric/Behavioral: Negative  Objective:   Physical Exam   Constitutional: She is oriented to person, place, and time  She appears well-developed and well-nourished  No distress  Appears older than stated age     HENT:   Head: Normocephalic and atraumatic  Mouth/Throat: Oropharynx is clear and moist  No oropharyngeal exudate  Poor dentition     Eyes: Conjunctivae and EOM are normal  Pupils are equal, round, and reactive to light  No scleral icterus  Neck: Normal range of motion  Neck supple  No JVD present  No tracheal deviation present  No thyromegaly present  Transverse cervical scar s/p thyroidectomy     Cardiovascular: Normal rate, regular rhythm, normal heart sounds and intact distal pulses  Exam reveals no gallop and no friction rub  No murmur heard  Pulmonary/Chest: Effort normal and breath sounds normal  No respiratory distress  She has no wheezes  She has no rales  She exhibits no tenderness  Abdominal: Soft  Bowel sounds are normal  She exhibits no distension and no mass  There is no tenderness  There is no rebound and no guarding  Musculoskeletal: Normal range of motion  She exhibits edema  She exhibits no tenderness or deformity  Lymphadenopathy:     She has no cervical adenopathy  Neurological: She is alert and oriented to person, place, and time  Skin: Skin is warm and dry  No rash noted   She is not diaphoretic  No erythema  No pallor  Psychiatric: She has a normal mood and affect  Her behavior is normal  Judgment and thought content normal    Nursing note and vitals reviewed  BP (!) 185/79 (BP Location: Right arm, Patient Position: Sitting, Cuff Size: Adult)   Pulse 75   Temp (!) 97 3 °F (36 3 °C)   Ht 5' 4" (1 626 m)   Wt 80 6 kg (177 lb 9 6 oz)   SpO2 97%   BMI 30 48 kg/m²      No Chest XR results available for this patient  No CT Chest results available for this patient  No CT Chest,Abdomen,Pelvis results available for this patient  No NM PET CT results available for this patient  No Barium Swallow results available for this patient  7/21/18 CT Chest PE protocol - There is a 3 5 x 2 4 x 2 5 cm soft tissue mass in the superior aspect of the anterior mediastinum, extending from the sternal notch inferiorly to the manubrium (series 2, image 57 and series 603, image 73)  Differential includes   residual thyroid tissue/retrosternal goiter  Less likely would be thymic lesion or germ cell tumor      Marvin Moreno MD  Thoracic Surgeon    [phone]

## 2018-08-08 NOTE — PROGRESS NOTES
Thoracic Consult  Assessment/Plan:   76-year-old female with 3 5 x 2 4 x 2 5 cm anterior mediastinal mass, likely thymoma  This is a well incapsulated, homogenous, anterior mediastinal mass that has not invaded into any surrounding structures based on her imaging  Although relatively indolent in nature, this has the potential to grow and spread via invasion into surrounding tissues  At this time this appears to be a thymoma and is potentially completely resectable  She has a multitude of other medical issues that we are trying to deal with concurrently, including, bilateral cataract surgery, right shoulder surgery, and Cardiology evaluation  I have discussed with the patient and her family extensively that this is not an emergent procedure but should be addressed in the coming months  We had preliminary discussions about surgical resection  We would plan on performing a robotic assisted thymectomy via small focal incisions  I would anticipate her staying in the hospital for 1-2 nights  She should be almost completely recovered 2-3 weeks postoperatively  Prior to her undergoing surgery I would like her to obtain pulmonary function testing, laboratory evaluation including acetylcholine receptor antibodies, and a cardiology evaluation  We will see her back in the office following all these and potentially schedule her for surgery at that time  We are looking tentatively at early to mid September for surgery  Diagnoses and all orders for this visit:    Mediastinal mass  -     Complete pulmonary function test; Future  -     Acetylcholine Receptor Ab, All; Future  -     Ambulatory referral to Cardiology; Future  -     CBC and differential; Future  -     Basic metabolic panel; Future  -     Type and screen; Future  -     Protime-INR; Future  -     APTT;  Future            Thoracic History   Diagnosis:    Procedures/Surgeries:    Pathology:    Adjuvant Therapy:       Subjective:    Patient ID: Magalys Lozada Sarahi Gil is a 71 y o  female  HPI  Galilea Zapata is a 81-year-old [place] female, [place]divian-speaking only, with past medical surgery of diabetes mellitus on insulin, hypertension, hyperlipidemia and recent right shoulder issues who was incidentally found to have a an anterior mediastinal mass  She came from [facility] on vacation to visit family and has been dealing with a multitude of medical issues since that time  She has had significant right shoulder issues for which she has been seen in the ER and seeing Orthopedic surgery  She presented to the ER in July with complaints of shortness of breath  She was found to have a 3 5 x 2 4 cm anterior mediastinal mass on a CT scan at that time  She follows up in our office for that  She comes accompanied by her son, daughter, and daughter-in-law to help translate  She only complains of mild shortness of breath  She denies any dysphagia  She denies any significant chest pain  She is very active around the house cooking, cleaning and has no issues with stairs  She does note some increasing fatigue of unclear etiology  No significant weight changes  No unexplained fevers or chills  Her vision has been declining for a number of years and she complains mostly of floaters  She has bilateral cataract surgery scheduled for the coming weeks        The following portions of the patient's history were reviewed and updated as appropriate: allergies, current medications, past family history, past medical history, past social history, past surgical history and problem list     Past Medical History:   Diagnosis Date    Diabetes mellitus ([address] )     Hyperlipidemia     Hypertension     Mediastinal mass     Right shoulder pain       Past Surgical History:   Procedure Laterality Date    APPENDECTOMY      CHOLECYSTECTOMY      EYE SURGERY      KNEE SURGERY      THYROID SURGERY        Family History   Problem Relation Age of Onset    Heart disease Mother     Cancer Father     Heart disease Father     Diabetes Brother       Social History     Social History    Marital status: /Civil Union     Spouse name: N/A    Number of children: N/A    Years of education: N/A     Occupational History    Not on file  Social History Main Topics    Smoking status: Former Smoker     Packs/day: 0 50     Years: 20 00     Quit date: 1987    Smokeless tobacco: Never Used    Alcohol use No    Drug use: No    Sexual activity: Not on file     Other Topics Concern    Not on file     Social History Narrative    No narrative on file      Review of Systems   Constitutional: Positive for fatigue  Negative for activity change, appetite change, chills, diaphoresis, fever and unexpected weight change  HENT: Negative  Eyes: Positive for visual disturbance  Complains of "floaters" bilaterally     Respiratory: Negative for apnea, cough, chest tightness, shortness of breath, wheezing and stridor  Cardiovascular: Negative for chest pain, palpitations and leg swelling  Gastrointestinal: Positive for abdominal distention  Negative for abdominal pain, blood in stool, constipation, diarrhea, nausea and vomiting  Endocrine: Negative  Genitourinary: Negative  Musculoskeletal: Negative  Skin: Negative  Allergic/Immunologic: Negative  Neurological: Negative  Hematological: Negative  Psychiatric/Behavioral: Negative  Objective:   Physical Exam   Constitutional: She is oriented to person, place, and time  She appears well-developed and well-nourished  No distress  Appears older than stated age     HENT:   Head: Normocephalic and atraumatic  Mouth/Throat: Oropharynx is clear and moist  No oropharyngeal exudate  Poor dentition     Eyes: Conjunctivae and EOM are normal  Pupils are equal, round, and reactive to light  No scleral icterus  Neck: Normal range of motion  Neck supple  No JVD present  No tracheal deviation present  No thyromegaly present  Transverse cervical scar s/p thyroidectomy     Cardiovascular: Normal rate, regular rhythm, normal heart sounds and intact distal pulses  Exam reveals no gallop and no friction rub  No murmur heard  Pulmonary/Chest: Effort normal and breath sounds normal  No respiratory distress  She has no wheezes  She has no rales  She exhibits no tenderness  Abdominal: Soft  Bowel sounds are normal  She exhibits no distension and no mass  There is no tenderness  There is no rebound and no guarding  Musculoskeletal: Normal range of motion  She exhibits edema  She exhibits no tenderness or deformity  Lymphadenopathy:     She has no cervical adenopathy  Neurological: She is alert and oriented to person, place, and time  Skin: Skin is warm and dry  No rash noted  She is not diaphoretic  No erythema  No pallor  Psychiatric: She has a normal mood and affect  Her behavior is normal  Judgment and thought content normal    Nursing note and vitals reviewed  BP (!) 185/79 (BP Location: Right arm, Patient Position: Sitting, Cuff Size: Adult)   Pulse 75   Temp (!) 97 3 °F (36 3 °C)   Ht 5' 4" (1 626 m)   Wt 80 6 kg (177 lb 9 6 oz)   SpO2 97%   BMI 30 48 kg/m²     No Chest XR results available for this patient  No CT Chest results available for this patient  No CT Chest,Abdomen,Pelvis results available for this patient  No NM PET CT results available for this patient  No Barium Swallow results available for this patient  7/21/18 CT Chest PE protocol - There is a 3 5 x 2 4 x 2 5 cm soft tissue mass in the superior aspect of the anterior mediastinum, extending from the sternal notch inferiorly to the manubrium (series 2, image 57 and series 603, image 73)  Differential includes   residual thyroid tissue/retrosternal goiter  Less likely would be thymic lesion or germ cell tumor      Osvaldo Melendrez MD  Thoracic Surgeon    [phone]

## 2018-08-08 NOTE — PATIENT INSTRUCTIONS
Please obtain the following  1  Pulmonary function testing  2  Cardiology evaluation - pre-operative evaluation and risk stratification for robotic assisted thymectomy  3  Lab testing - Acetylcholine receptor antibodies    Follow-up in our office following the above to discuss and schedule surgery

## 2018-08-08 NOTE — TELEPHONE ENCOUNTER
Elen, from Dr Amber Lima office called and ask that if when Dr Akhil Casas sees patient on 8/24 can Dr Akhil Casas get patient in for a stress test before 9/4

## 2018-08-17 NOTE — PRE-PROCEDURE INSTRUCTIONS
Pre-Surgery Instructions:   Medication Instructions    aspirin 81 mg chewable tablet Instructed patient per Anesthesia Guidelines   BACLOFEN PO Instructed patient per Anesthesia Guidelines   cyclobenzaprine (FLEXERIL) 5 mg tablet Instructed patient per Anesthesia Guidelines   diclofenac sodium (VOLTAREN) 1 % Instructed patient per Anesthesia Guidelines   insulin NPH-insulin regular (NovoLIN 70/30) 100 units/mL subcutaneous injection Instructed patient per Anesthesia Guidelines   levothyroxine 100 mcg tablet Instructed patient per Anesthesia Guidelines   losartan (COZAAR) 50 mg tablet Instructed patient per Anesthesia Guidelines   metFORMIN (GLUCOPHAGE) 1000 MG tablet Instructed patient per Anesthesia Guidelines

## 2018-08-17 NOTE — PRE-PROCEDURE INSTRUCTIONS
Pre-Surgery Instructions:   Medication Instructions    aspirin 81 mg chewable tablet Instructed patient per Anesthesia Guidelines   BACLOFEN PO Instructed patient per Anesthesia Guidelines   cyclobenzaprine (FLEXERIL) 5 mg tablet Instructed patient per Anesthesia Guidelines   diclofenac sodium (VOLTAREN) 1 % Instructed patient per Anesthesia Guidelines   insulin NPH-insulin regular (NovoLIN 70/30) 100 units/mL subcutaneous injection Instructed patient per Anesthesia Guidelines   levothyroxine 100 mcg tablet Instructed patient per Anesthesia Guidelines   losartan (COZAAR) 50 mg tablet Instructed patient per Anesthesia Guidelines   metFORMIN (GLUCOPHAGE) 1000 MG tablet Instructed patient per Anesthesia Guidelines  Do not take this medication the day before and the morning of the day of surgery/procedure  ASA Med Class: Aspirin     Should be discontinued at least one week prior to planned operation, unless specifically stated otherwise by surgical service  Your Surgeon may have patient stop taking aspirin up to a week before surgery if having intracranial, middle ear, posterior eye, spine surgery or prostate surgery  [Patients taking aspirin for coronary stents should be reviewed by an anesthesiologist in the optimization clinic  Please do not discontinue aspirin in patients with coronary stents unless given specific permission to do so by the cardiologist who prescribed medication ]   If your surgeon approves please continue to take this medication on your normal schedule  You may take this medication on the morning of your surgery with a sip of water  Insulin Med Class     Pre-Surgery/Procedure Instructions for Adult Patients who Take Medicine for Diabetes or to Control their Blood Sugar     Day Before Surgery/Procedure  Use the directions based on the type of medicine you take for your diabetes  1  If you are having a procedure that does not require a bowel prep:  ?  Pre-Mixed Insulin (Intermediate Acting: Humalog 75/25, Humulin 70/30  Novolog 70/30, Regular Insulin)  § Take ½ your regular dose the evening before your procedure  ? Rapid/Fast Acting Insulin/Long Acting Insulin (Humalog U200, NovoLog, Apidra, Lantus, Levemir, Dior Riis, [place])  § Take your FULL regular dose the day before procedure  ? Oral Diabetic Medicines including Glipizide/Glimepiride/Glucotrol (sulfonylurea)  § Take your regular dose with dinner the evening before your procedure  2  If you are having a procedure (e g  Colonoscopy) that requires a bowel prep and you are allowed to have at least a clear liquid diet:  ? Pre-Mixed Insulin (Intermediate Acting: Humalog 75/25, Humulin 70/30, Novolog 70/30, Regular Insulin)  § Take ½ your regular dose the evening before your procedure  ? Rapid/Fast Acting Insulin (Humalog U200, NovoLog, Apidra, Fiasp)  § Take ½ your regular dose the evening before your procedure  ? Long Acting Insulin (Lantus, Levemir, Dior Riis)  § Take your FULL regular dose the day before procedure  ? Oral Glipizide/Glimepiride/Glucotrol (sulfonylurea)  § Take ½ your regular dose the evening before your procedure  ? Oral Diabetic Medicines that are NOT Glipizide/Glimepiride/Glucotrol  § Take your regular dose with dinner in the evening before your procedure      Day of Surgery/Procedure  · Long Acting Insulin (Lantus, Levemir, Dior Riis)  ? If you usually take your Long-Acting Insulin in the morning, take the full dose as scheduled  · With the exception of the morning Long-Acting Insulin noted above, DO NOT take ANY diabetic medicine on the day of your procedure unless you were instructed by the doctor who manages your diabetic medicines  · Continue to check your blood sugars  · If you have an insulin pump then consult with your Endocrinologist for instructions  · If you cannot see your Endocrinologist, on the day of the procedure set your insulin pump to your basal rate only  Please bring your insulin pump supplies to the hospital      This Educational material has been approved by the Patient Education Advisory Committee  Date prepared: 1/17/2018          Expiration date: 1/17/2019        Approval Number:                     Thyroxine Med Class     Continue to take this medication on your normal schedule  If this is an oral medication and you take it in the morning, then you may take this medicine with a sip of water

## 2018-08-22 ENCOUNTER — OFFICE VISIT (OUTPATIENT)
Dept: OBGYN CLINIC | Facility: CLINIC | Age: 73
End: 2018-08-22
Payer: COMMERCIAL

## 2018-08-22 VITALS
HEART RATE: 81 BPM | DIASTOLIC BLOOD PRESSURE: 73 MMHG | WEIGHT: 179.4 LBS | HEIGHT: 64 IN | BODY MASS INDEX: 30.63 KG/M2 | SYSTOLIC BLOOD PRESSURE: 157 MMHG

## 2018-08-22 DIAGNOSIS — M75.101 TEAR OF RIGHT ROTATOR CUFF, UNSPECIFIED TEAR EXTENT: Primary | ICD-10-CM

## 2018-08-22 PROCEDURE — 99213 OFFICE O/P EST LOW 20 MIN: CPT | Performed by: ORTHOPAEDIC SURGERY

## 2018-08-22 RX ORDER — DIFLUPREDNATE 0.5 MG/ML
EMULSION OPHTHALMIC
COMMUNITY
Start: 2018-08-03 | End: 2019-07-31

## 2018-08-22 RX ORDER — CHOLECALCIFEROL (VITAMIN D3) 125 MCG
500 CAPSULE ORAL DAILY
Refills: 1 | COMMUNITY
Start: 2018-08-21

## 2018-08-22 NOTE — PROGRESS NOTES
Chief Complaint   Patient presents with    Right Shoulder - Follow-up         SUBJECTIVE  Pt is here to follow up for her right shoulder pain and review her MRI  Pt states she is still in significant pain all day long and has difficulty doing ADL due to the pain  Pt stopped physical therapy due to increased pain and an issue with her insurance  Pt has had prior CSI with short term relief  Pt has a mass on her chest that she is currently scheduling surgical removal of in early September  Pt's daughter accompanied her to the visit today and translated with her mother's permission          ROS:   General: no fever, no chills  HEENT:  No loss of hearing or eyesight problems  Eyes:  No red eyes  Respiratory:  No coughing, shortness of breath or wheezing  Cardiovascular:  No chest pain, no palpitations  GI:  Abdomen soft nontender, denies nausea  Endocrine:  No muscle weakness, no frequent urination, no excessive thirst  Urinary:  No dysuria, no incontinence  Musculoskeletal: see HPI and PE  SKIN:  No skin rash, no dry skin  Neurological:  No headaches, no confusion  Psychiatric:  No suicide thoughts, no anxiety, no depression  Review of all other systems is negative    Past Medical History:   Diagnosis Date    Diabetes mellitus [address] [address] )     Hyperlipidemia     Hypertension     Mediastinal mass     Right shoulder pain        Current Outpatient Prescriptions on File Prior to Visit   Medication Sig Dispense Refill    aspirin 81 mg chewable tablet Chew 81 mg daily      BACLOFEN PO Take 5 mg by mouth daily      cyclobenzaprine (FLEXERIL) 5 mg tablet Take 1 tablet (5 mg total) by mouth 3 (three) times a day as needed for muscle spasms for up to 30 doses 30 tablet 0    insulin NPH-insulin regular (NovoLIN 70/30) 100 units/mL subcutaneous injection Inject under the skin 2 (two) times a day before meals      levothyroxine 100 mcg tablet Take 1 tablet (100 mcg total) by mouth daily 30 tablet 0    losartan (COZAAR) 50 mg tablet Take 50 mg by mouth daily      metFORMIN (GLUCOPHAGE) 1000 MG tablet Take 1,000 mg by mouth 2 (two) times a day with meals      diclofenac sodium (VOLTAREN) 1 % Apply 2 g topically 4 (four) times a day for 30 days (Patient taking differently: Apply 2 g topically 4 (four) times a day as needed  ) 1 Tube 0     No current facility-administered medications on file prior to visit  No Known Allergies    Social History       PHYSICAL EXAM:    General Appearance:  Alert, cooperative, no distress, appears stated age   Vital Signs: /73   Pulse 81   Ht 5' 4" (1 626 m)   Wt 81 4 kg (179 lb 6 4 oz)   BMI 30 79 kg/m²    Lungs:   respirations unlabored   Abdomen:   Soft, non-tender   Extremities: Extremities normal, atraumatic, no cyanosis or edema   Pulses: 2+ and symmetric   Skin: Skin color, texture, turgor normal, no rashes or lesions   Neurologic: Motor and sensory exam non focal         MRI of the right shoulder was reviewed today and my interpretation is significant rotator cuff tear  Ortho Exam     Right shoulder   Active forward flexion to 130 deg  Full active extension with pain  Full active abduction with pain  Diffuse tenderness to palpation of the right shoulder   Biceps, brachioradialis, and triceps reflexes 1+ symetrically    Strength  Weak and painful external rotation  Weak and painful abduction  Good strength but discomfort with elbow flexion  Pain with flexion and extension of neck      ASSESSMENT AND PLAN:    Right shoulder rotator cuff tear  *pt would like to proceed with rotator cuff repair, although she must wait until she is recovered from the mass removal scheduled in early September  *Pt will follow up at the end of September for revaluation and to schedule surgery     Chris Mckinney was seen today for follow-up      Diagnoses and all orders for this visit:    Tear of right rotator cuff, unspecified tear extent        Scribe Attestation    I,:   Rebeca Pearl am acting as a scribe while in the presence of the attending physician :        I,:   José Terry MD personally performed the services described in this documentation    as scribed in my presence :

## 2018-08-23 ENCOUNTER — HOSPITAL ENCOUNTER (OUTPATIENT)
Dept: PULMONOLOGY | Facility: HOSPITAL | Age: 73
Discharge: HOME | End: 2018-08-23
Attending: THORACIC SURGERY (CARDIOTHORACIC VASCULAR SURGERY)
Payer: MEDICARE

## 2018-08-23 DIAGNOSIS — J98.59 MEDIASTINAL MASS: ICD-10-CM

## 2018-08-23 PROCEDURE — 94760 N-INVAS EAR/PLS OXIMETRY 1: CPT

## 2018-08-23 PROCEDURE — 94726 PLETHYSMOGRAPHY LUNG VOLUMES: CPT | Performed by: INTERNAL MEDICINE

## 2018-08-23 PROCEDURE — 94729 DIFFUSING CAPACITY: CPT | Performed by: INTERNAL MEDICINE

## 2018-08-23 PROCEDURE — 94060 EVALUATION OF WHEEZING: CPT

## 2018-08-23 PROCEDURE — 94060 EVALUATION OF WHEEZING: CPT | Performed by: INTERNAL MEDICINE

## 2018-08-23 PROCEDURE — 94727 GAS DIL/WSHOT DETER LNG VOL: CPT

## 2018-08-23 PROCEDURE — 94729 DIFFUSING CAPACITY: CPT

## 2018-08-23 RX ORDER — ALBUTEROL SULFATE 2.5 MG/3ML
2.5 SOLUTION RESPIRATORY (INHALATION) ONCE
Status: COMPLETED | OUTPATIENT
Start: 2018-08-23 | End: 2018-08-23

## 2018-08-23 RX ADMIN — ALBUTEROL SULFATE 2.5 MG: 2.5 SOLUTION RESPIRATORY (INHALATION) at 14:13

## 2018-08-27 ENCOUNTER — OFFICE VISIT (OUTPATIENT)
Dept: CARDIOLOGY CLINIC | Facility: CLINIC | Age: 73
End: 2018-08-27
Payer: MEDICARE

## 2018-08-27 VITALS
HEART RATE: 77 BPM | OXYGEN SATURATION: 97 % | WEIGHT: 176.8 LBS | BODY MASS INDEX: 30.19 KG/M2 | DIASTOLIC BLOOD PRESSURE: 78 MMHG | HEIGHT: 64 IN | SYSTOLIC BLOOD PRESSURE: 150 MMHG

## 2018-08-27 DIAGNOSIS — J98.59 MEDIASTINAL MASS: ICD-10-CM

## 2018-08-27 DIAGNOSIS — I10 ESSENTIAL HYPERTENSION: ICD-10-CM

## 2018-08-27 DIAGNOSIS — R06.00 DYSPNEA ON EXERTION: ICD-10-CM

## 2018-08-27 DIAGNOSIS — Z01.810 PREOP CARDIOVASCULAR EXAM: Primary | ICD-10-CM

## 2018-08-27 DIAGNOSIS — E78.2 MIXED HYPERLIPIDEMIA: ICD-10-CM

## 2018-08-27 PROCEDURE — 99243 OFF/OP CNSLTJ NEW/EST LOW 30: CPT | Performed by: INTERNAL MEDICINE

## 2018-08-27 NOTE — LETTER
August 27, 2018     Patrickndyolie Suero, [address]    Patient: Alisha Lynch   YOB: 1945   Date of Visit: 8/27/2018       Dear Dr Cleve Welch: Thank you for referring Cyndi Fishman to me for evaluation  Below are my notes for this consultation  If you have questions, please do not hesitate to call me  I look forward to following your patient along with you  Sincerely,        Jg Hou MD        CC: No Recipients  Jg Hou MD  8/27/2018  4:34 PM  Sign at close encounter                                             Cardiology Consultation     Alisha Lynch  [id number]  1945  [address] [facility]  [address] [facility] [address]    1  Mediastinal mass  Ambulatory referral to Cardiology     C/C: PREOP EVALUATION PRIOR TO MEDIASTINAL MASS REMOVAL    HPI: 36-year-old female patient with past medical history of diabetes, hypertension, hyperlipidemia  And former smoker is here for evaluation prior to mediastinal mass removal   Patient is not very active but denies having any chest pain on exertion  She does get occasional shortness of breath on exertion especially when she is will taking stairs  She also reports tiredness  She denies having any palpitations dizziness or syncope  She does have mild lower extremity edema which he says is chronic and had for last 5-6 years  Patient's diabetes is poorly controlled       Patient did not have any recent stress test     Patient Active Problem List   Diagnosis    Impingement syndrome of right shoulder    Mediastinal mass     Past Medical History:   Diagnosis Date    Diabetes mellitus ([address] )     Hyperlipidemia     Hypertension     Mediastinal mass     Right shoulder pain      Social History     Social History    Marital status: /Civil Union     Spouse name: N/A    Number of children: N/A    Years of education: N/A     Occupational History    Not on file       Social History Main Topics    Smoking status: Former Smoker     Packs/day: 0 50     Years: 20 00     Quit date: 1987    Smokeless tobacco: Never Used    Alcohol use No    Drug use: No    Sexual activity: Not on file     Other Topics Concern    Not on file     Social History Narrative    No narrative on file      Family History   Problem Relation Age of Onset    Heart disease Mother     Cancer Father     Heart disease Father     Diabetes Brother      Past Surgical History:   Procedure Laterality Date    APPENDECTOMY      CHOLECYSTECTOMY      EYE SURGERY      KNEE SURGERY      THYROID SURGERY         Current Outpatient Prescriptions:     aspirin 81 mg chewable tablet, Chew 81 mg daily, Disp: , Rfl:     BACLOFEN PO, Take 5 mg by mouth daily, Disp: , Rfl:     cyanocobalamin (VITAMIN B-12) 500 mcg tablet, Take 500 mcg by mouth daily, Disp: , Rfl: 1    cyclobenzaprine (FLEXERIL) 5 mg tablet, Take 1 tablet (5 mg total) by mouth 3 (three) times a day as needed for muscle spasms for up to 30 doses, Disp: 30 tablet, Rfl: 0    diclofenac sodium (VOLTAREN) 1 %, Apply 2 g topically 4 (four) times a day for 30 days (Patient taking differently: Apply 2 g topically 4 (four) times a day as needed  ), Disp: 1 Tube, Rfl: 0    Difluprednate (DUREZOL) 0 05 % EMUL, FOR USE AFTER SURGERY INSTILL 1 DROP INTO RIGHT EYE FOUR TIMES DAILY, Disp: , Rfl:     insulin NPH-insulin regular (NovoLIN 70/30) 100 units/mL subcutaneous injection, Inject under the skin 2 (two) times a day before meals, Disp: , Rfl:     levothyroxine 100 mcg tablet, Take 1 tablet (100 mcg total) by mouth daily, Disp: 30 tablet, Rfl: 0    losartan (COZAAR) 50 mg tablet, Take 50 mg by mouth daily, Disp: , Rfl:     metFORMIN (GLUCOPHAGE) 1000 MG tablet, Take 1,000 mg by mouth 2 (two) times a day with meals, Disp: , Rfl:     Nepafenac (ILEVRO) 0 3 % SUSP, INSTILL 1 DROP INTO RIGHT EYE DAILY, Disp: , Rfl:   No Known Allergies  Vitals:    08/27/18 1612   BP: 150/78   Pulse: 77   SpO2: 97%   Weight: 80 2 kg (176 lb 12 8 oz)   Height: 5' 4" (1 626 m)       Labs:  Admission on 07/21/2018, Discharged on 07/21/2018   Component Date Value    WBC 07/21/2018 5 89     RBC 07/21/2018 3 93     Hemoglobin 07/21/2018 11 4*    Hematocrit 07/21/2018 34 9     MCV 07/21/2018 89     MCH 07/21/2018 29 0     MCHC 07/21/2018 32 7     RDW 07/21/2018 15 0     MPV 07/21/2018 9 0     Platelets 87/04/3694 265     nRBC 07/21/2018 0     Neutrophils Relative 07/21/2018 62     Immat GRANS % 07/21/2018 1     Lymphocytes Relative 07/21/2018 27     Monocytes Relative 07/21/2018 8     Eosinophils Relative 07/21/2018 2     Basophils Relative 07/21/2018 0     Neutrophils Absolute 07/21/2018 3 60     Immature Grans Absolute 07/21/2018 0 05     Lymphocytes Absolute 07/21/2018 1 59     Monocytes Absolute 07/21/2018 0 49     Eosinophils Absolute 07/21/2018 0 14     Basophils Absolute 07/21/2018 0 02     Sodium 07/21/2018 140     Potassium 07/21/2018 3 6     Chloride 07/21/2018 105     CO2 07/21/2018 30     ANION GAP 07/21/2018 5     BUN 07/21/2018 11     Creatinine 07/21/2018 0 87     Glucose 07/21/2018 72     Calcium 07/21/2018 8 1*    AST 07/21/2018 20     ALT 07/21/2018 31     Alkaline Phosphatase 07/21/2018 58     Total Protein 07/21/2018 7 3     Albumin 07/21/2018 3 2*    Total Bilirubin 07/21/2018 0 40     eGFR 07/21/2018 67     Lipase 07/21/2018 212     Troponin I 07/21/2018 <0 02     D-Dimer, Quant 07/21/2018 1167*    Color, UA 07/21/2018 Yellow     Clarity, UA 07/21/2018 Clear     Specific Gravity, UA 07/21/2018 1 010     pH, UA 07/21/2018 7 0     Leukocytes, UA 07/21/2018 Negative     Nitrite, UA 07/21/2018 Negative     Protein, UA 07/21/2018 Negative     Glucose, UA 07/21/2018 Negative     Ketones, UA 07/21/2018 Negative     Urobilinogen, UA 07/21/2018 1 0     Bilirubin, UA 07/21/2018 Negative     Blood, UA 07/21/2018 Trace-Intact*    RBC, UA 07/21/2018 0-1*    WBC, UA 07/21/2018 None Seen     Epithelial Cells 07/21/2018 Occasional     Bacteria, UA 07/21/2018 None Seen     Troponin I 07/21/2018 <0 02     Ventricular Rate 07/21/2018 73     Atrial Rate 07/21/2018 73     WA Interval 07/21/2018 176     QRSD Interval 07/21/2018 88     QT Interval 07/21/2018 426     QTC Interval 07/21/2018 469     P Axis 07/21/2018 63     QRS Axis 07/21/2018 50     T Wave Axis 07/21/2018 35     Ventricular Rate 07/21/2018 76     Atrial Rate 07/21/2018 76     WA Interval 07/21/2018 164     QRSD Interval 07/21/2018 88     QT Interval 07/21/2018 400     QTC Interval 07/21/2018 450     P Axis 07/21/2018 55     QRS Axis 07/21/2018 45     T Wave Axis 07/21/2018 27      Imaging: No results found  Review of Systems:  Review of Systems   Constitutional: Negative for diaphoresis and fatigue  HENT: Negative for congestion and facial swelling  Eyes: Negative for photophobia and visual disturbance  Respiratory: Negative for chest tightness and shortness of breath  Cardiovascular: Negative for chest pain, palpitations and leg swelling  Gastrointestinal: Negative for abdominal pain and nausea  Endocrine: Negative for cold intolerance and heat intolerance  Musculoskeletal: Negative for arthralgias and myalgias  Skin: Negative for pallor and rash  Neurological: Negative for dizziness and tremors  Psychiatric/Behavioral: Negative for sleep disturbance  The patient is not nervous/anxious  Physical Exam:  Physical Exam   Constitutional: She is oriented to person, place, and time  She appears well-developed and well-nourished  HENT:   Head: Normocephalic and atraumatic  Eyes: Conjunctivae and EOM are normal  Pupils are equal, round, and reactive to light  Neck: Normal range of motion  Neck supple  No JVD present  No thyromegaly present     Cardiovascular: Normal rate, regular rhythm, S1 normal, S2 normal, normal heart sounds and intact distal pulses  Exam reveals no gallop and no friction rub  No murmur heard  Pulses:       Carotid pulses are 2+ on the right side, and 2+ on the left side  1+ EDEMA   Pulmonary/Chest: Effort normal and breath sounds normal  No respiratory distress  She has no wheezes  She has no rales  Abdominal: Soft  Bowel sounds are normal  She exhibits no distension  There is no tenderness  There is no rebound and no guarding  Musculoskeletal: Normal range of motion  She exhibits no edema  Neurological: She is alert and oriented to person, place, and time  She has normal reflexes  No cranial nerve deficit  Skin: Skin is warm and dry  Psychiatric: She has a normal mood and affect  EKG: NORMAL SINUS RHYTHM, NORMAL AXIS, NORMAL EKG    Discussion/Summary:  1    preop cardiovascular exam:   patient uncontrolled diabetes and she also has other risk factors including hypertension, hyperlipidemia and smoking  She is at significant risk for coronary artery disease  She also has shortness of breath on exertion which could be angina equivalent  I will get nuclear stress test rule out ischemia   she also get echocardiogram to evaluate for any structural heart disease     2  Hypertension,  Systolic blood pressure mildly elevated      Will continue monitor for now if her systolic blood pressure persistently high I will increase dose of losartan to 100 mg      3  Hyperlipidemia, a check lipid profile     further recommendations will follow after nuclear stress test and echocardiogram     follow up with me after surgery for elevated blood pressure

## 2018-08-27 NOTE — ED PROVIDER NOTES
History  Chief Complaint   Patient presents with    Flank Pain     pt came in for right flank pain; states it hurts when she breaths       History provided by:  Patient   used: No    Shoulder Pain   Location:  Shoulder  Shoulder location:  R shoulder  Injury: no    Pain details:     Quality:  Aching    Radiates to:  Chest    Severity:  Moderate    Onset quality:  Gradual    Timing:  Constant    Progression:  Waxing and waning  Handedness:  Right-handed  Dislocation: no    Prior injury to area:  Yes  Relieved by:  Nothing  Worsened by:  Nothing  Ineffective treatments:  None tried  Associated symptoms: no back pain, no fatigue, no fever and no neck pain        Prior to Admission Medications   Prescriptions Last Dose Informant Patient Reported?  Taking?   aspirin 81 mg chewable tablet  Self Yes No   Sig: Chew 81 mg daily   cyclobenzaprine (FLEXERIL) 5 mg tablet  Self No No   Sig: Take 1 tablet (5 mg total) by mouth 3 (three) times a day as needed for muscle spasms for up to 30 doses   diclofenac sodium (VOLTAREN) 1 %   No No   Sig: Apply 2 g topically 4 (four) times a day for 30 days   Patient taking differently: Apply 2 g topically 4 (four) times a day as needed     levothyroxine 100 mcg tablet  Self No No   Sig: Take 1 tablet (100 mcg total) by mouth daily   losartan (COZAAR) 50 mg tablet  Self Yes No   Sig: Take 50 mg by mouth daily      Facility-Administered Medications: None       Past Medical History:   Diagnosis Date    Diabetes mellitus ([address] )     Hyperlipidemia     Hypertension     Mediastinal mass     Right shoulder pain        Past Surgical History:   Procedure Laterality Date    APPENDECTOMY      CHOLECYSTECTOMY      EYE SURGERY      KNEE SURGERY      THYROID SURGERY         Family History   Problem Relation Age of Onset    Heart disease Mother     Cancer Father     Heart disease Father     Diabetes Brother      I have reviewed and agree with the history as documented  Social History   Substance Use Topics    Smoking status: Former Smoker     Packs/day: 0 50     Years: 20 00     Quit date: 1987    Smokeless tobacco: Never Used    Alcohol use No        Review of Systems   Constitutional: Negative for activity change, appetite change, fatigue, fever and unexpected weight change  HENT: Negative for sore throat and voice change  Eyes: Negative for pain and visual disturbance  Respiratory: Negative for cough, chest tightness and shortness of breath  Cardiovascular: Positive for chest pain  Gastrointestinal: Negative for abdominal pain, diarrhea, nausea and vomiting  Endocrine: Negative for polyphagia and polyuria  Genitourinary: Negative for difficulty urinating, dysuria, flank pain, frequency and urgency  Musculoskeletal: Negative for back pain, gait problem, neck pain and neck stiffness  Skin: Negative for color change and rash  Allergic/Immunologic: Negative for immunocompromised state  Neurological: Negative for dizziness, syncope, speech difficulty, light-headedness, numbness and headaches  Hematological: Does not bruise/bleed easily  Psychiatric/Behavioral: Negative for confusion and decreased concentration  Physical Exam  Physical Exam   Constitutional: She is oriented to person, place, and time  She appears well-developed and well-nourished  HENT:   Head: Normocephalic and atraumatic  Eyes: Conjunctivae and EOM are normal  Pupils are equal, round, and reactive to light  No scleral icterus  Neck: Normal range of motion  Neck supple  No JVD present  No tracheal deviation present  Cardiovascular: Normal rate and regular rhythm  Pulmonary/Chest: Effort normal and breath sounds normal  No respiratory distress  Abdominal: Soft  She exhibits no distension  There is no tenderness  Musculoskeletal: Normal range of motion  She exhibits no tenderness or deformity     Right shoulder: normal ROM and no palpable deformity though pt c/o pain with ROM  Normal NV exam distally   Lymphadenopathy:     She has no cervical adenopathy  Neurological: She is alert and oriented to person, place, and time  No gross focal sensory or motor deficits   Skin: Skin is warm and dry  No rash noted  She is not diaphoretic  Psychiatric: She has a normal mood and affect  Vitals reviewed        Vital Signs  ED Triage Vitals   Temperature Pulse Respirations Blood Pressure SpO2   07/21/18 0011 07/21/18 0005 07/21/18 0005 07/21/18 0005 07/21/18 0005   98 1 °F (36 7 °C) 79 18 (!) 184/81 94 %      Temp Source Heart Rate Source Patient Position - Orthostatic VS BP Location FiO2 (%)   07/21/18 0342 07/21/18 0005 07/21/18 0005 07/21/18 0005 --   Oral Monitor Lying Right arm       Pain Score       07/21/18 0128       Worst Possible Pain           Vitals:    07/21/18 0005 07/21/18 0128 07/21/18 0342 07/21/18 0507   BP: (!) 184/81 149/70 136/61 123/58   Pulse: 79 74 73 65   Patient Position - Orthostatic VS: Lying Lying Lying        Visual Acuity      ED Medications  Medications   sodium chloride 0 9 % bolus 1,000 mL (0 mL Intravenous Stopped 7/21/18 0453)   iohexol (OMNIPAQUE) 350 MG/ML injection (MULTI-DOSE) 100 mL (100 mL Intravenous Given 7/21/18 0137)       Diagnostic Studies  Results Reviewed     Procedure Component Value Units Date/Time    Troponin I [[id number]]  (Normal) Collected:  07/21/18 0342    Lab Status:  Final result Specimen:  Blood from Arm, Right Updated:  07/21/18 0409     Troponin I <0 02 ng/mL     D-Dimer [[id number]]  (Abnormal) Collected:  07/21/18 0105    Lab Status:  Final result Specimen:  Blood from Arm, Right Updated:  07/21/18 0143     D-Dimer, Quant 1,167 (H) ng/ml (FEU)     Urine Microscopic [[id number]]  (Abnormal) Collected:  07/21/18 0116    Lab Status:  Final result Specimen:  Urine from Urine, Clean Catch Updated:  07/21/18 0133     RBC, UA 0-1 (A) /hpf      WBC, UA None Seen /hpf      Epithelial Cells Occasional /hpf Bacteria, UA None Seen /hpf     Troponin I [[id number]]  (Normal) Collected:  07/21/18 0105    Lab Status:  Final result Specimen:  Blood from Arm, Right Updated:  07/21/18 0128     Troponin I <0 02 ng/mL     UA w Reflex to Microscopic w Reflex to Culture [[id number]]  (Abnormal) Collected:  07/21/18 0116    Lab Status:  Final result Specimen:  Urine from Urine, Clean Catch Updated:  07/21/18 0124     Color, UA Yellow     Clarity, UA Clear     Specific Gravity, UA 1 010     pH, UA 7 0     Leukocytes, UA Negative     Nitrite, UA Negative     Protein, UA Negative mg/dl      Glucose, UA Negative mg/dl      Ketones, UA Negative mg/dl      Urobilinogen, UA 1 0 E U /dl      Bilirubin, UA Negative     Blood, UA Trace-Intact (A)    Comprehensive metabolic panel [[id number]]  (Abnormal) Collected:  07/21/18 0034    Lab Status:  Final result Specimen:  Blood from Arm, Right Updated:  07/21/18 0058     Sodium 140 mmol/L      Potassium 3 6 mmol/L      Chloride 105 mmol/L      CO2 30 mmol/L      ANION GAP 5 mmol/L      BUN 11 mg/dL      Creatinine 0 87 mg/dL      Glucose 72 mg/dL      Calcium 8 1 (L) mg/dL      AST 20 U/L      ALT 31 U/L      Alkaline Phosphatase 58 U/L      Total Protein 7 3 g/dL      Albumin 3 2 (L) g/dL      Total Bilirubin 0 40 mg/dL      eGFR 67 ml/min/1 73sq m     Narrative:         National Kidney Disease Education Program recommendations are as follows:  GFR calculation is accurate only with a steady state creatinine  Chronic Kidney disease less than 60 ml/min/1 73 sq  meters  Kidney failure less than 15 ml/min/1 73 sq  meters      Lipase [[id number]]  (Normal) Collected:  07/21/18 0034    Lab Status:  Final result Specimen:  Blood from Arm, Right Updated:  07/21/18 0058     Lipase 212 u/L     CBC and differential [[id number]]  (Abnormal) Collected:  07/21/18 0034    Lab Status:  Final result Specimen:  Blood from Arm, Right Updated:  07/21/18 0041     WBC 5 89 Thousand/uL      RBC 3 93 Million/uL      Hemoglobin 11 4 (L) g/dL      Hematocrit 34 9 %      MCV 89 fL      MCH 29 0 pg      MCHC 32 7 g/dL      RDW 15 0 %      MPV 9 0 fL      Platelets 072 Thousands/uL      nRBC 0 /100 WBCs      Neutrophils Relative 62 %      Immat GRANS % 1 %      Lymphocytes Relative 27 %      Monocytes Relative 8 %      Eosinophils Relative 2 %      Basophils Relative 0 %      Neutrophils Absolute 3 60 Thousands/µL      Immature Grans Absolute 0 05 Thousand/uL      Lymphocytes Absolute 1 59 Thousands/µL      Monocytes Absolute 0 49 Thousand/µL      Eosinophils Absolute 0 14 Thousand/µL      Basophils Absolute 0 02 Thousands/µL                  PE Study with CT Abdomen and Pelvis with contrast   Final Result by Ghassan Machuca DO (07/21 0525)   1  No CT evidence of pulmonary embolism  2   Soft tissue mass in the superior aspect of the anterior mediastinum  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 and residual thyroid tissue  Also in the differential, however less likely would be thymic lesion or germ cell tumor  3   Hepatosplenomegaly  Abnormal contour of the liver, suggesting early cirrhotic changes  Clinical correlation recommended  The major findings are in agreement with the preliminary report provided by [facility] which was provided shortly after completion of the exam  The additional finding of  soft tissue mass in the superior aspect of the anterior mediastinum as well    as abnormal appearance of the liver and spleen as described above  The findings   will now be communicated with patient's clinical team by our radiology liaison  The study was marked in EPIC for significant notification           Workstation performed: [id number]                    Procedures  Procedures       Phone Contacts  ED Phone Contact    ED Course                               MDM  Number of Diagnoses or Management Options  Right shoulder pain: new and requires workup  Diagnosis management comments: 71 y o F with R side/shoulder pain for several days, worsening  Has seen ortho previously for shoulder pain  States pain worse with certain movements/positions also perhaps with deep inspiration  No overt cp or sob  EKG and trop negative for signs of ischemia, given pleuritic cp ddimer was sent and was elevated  Follow up CTA of chest negative for PE  Will d/c to f/u ortho, pcp  Amount and/or Complexity of Data Reviewed  Clinical lab tests: reviewed and ordered  Tests in the radiology section of CPT®: reviewed and ordered  Review and summarize past medical records: yes  Independent visualization of images, tracings, or specimens: yes      CritCare Time    Disposition  Final diagnoses:   Right shoulder pain     Time reflects when diagnosis was documented in both MDM as applicable and the Disposition within this note     Time User Action Codes Description Comment    7/21/2018  4:58 AM Benedicto Simental Add [S35 819] Right shoulder pain       ED Disposition     ED Disposition Condition Comment    Discharge  [facility] discharge to home/self care  Condition at discharge: Good        Follow-up Information     Follow up With Specialties Details Why [address], DO Sports Medicine  Please follow-up with your primary care physician and/or specialist as already scheduled  If you have new or worsening symptoms please call your doctor or return to the emergency department   [address] [address]  [phone]            Discharge Medication List as of 7/21/2018  5:02 AM      CONTINUE these medications which have NOT CHANGED    Details   aspirin 81 mg chewable tablet Chew 81 mg daily, Historical Med      cyclobenzaprine (FLEXERIL) 5 mg tablet Take 1 tablet (5 mg total) by mouth 3 (three) times a day as needed for muscle spasms for up to 30 doses, Starting Mon 5/28/2018, Print      diclofenac sodium (VOLTAREN) 1 % Apply 2 g topically 4 (four) times a day for 30 days, Starting Mon 5/28/2018, Until Wed 6/27/2018, Print      levothyroxine 100 mcg tablet Take 1 tablet (100 mcg total) by mouth daily, Starting Mon 5/28/2018, Print      losartan (COZAAR) 50 mg tablet Take 50 mg by mouth daily, Historical Med      baclofen 10 mg tablet Take 5 mg by mouth daily, Historical Med      insulin NPH-insulin regular (NovoLIN 70/30) 100 units/mL subcutaneous injection Inject 40 Units under the skin daily before breakfast, Starting Mon 5/28/2018, Print      metFORMIN (GLUCOPHAGE) 500 mg tablet Take 1,000 mg by mouth 2 (two) times a day with meals, Historical Med      naproxen (NAPROSYN) 500 mg tablet Take 250 mg by mouth 2 (two) times a day with meals, Historical Med           No discharge procedures on file      ED Provider  Electronically Signed by           Caridad Muñoz MD  08/27/18 0005

## 2018-08-27 NOTE — PROGRESS NOTES
Cardiology Consultation     Zbigniew Gallego  [id number]  1945  [address] [facility] [address] [facility] [address]    1  Mediastinal mass  Ambulatory referral to Cardiology     C/C: PREOP EVALUATION PRIOR TO MEDIASTINAL MASS REMOVAL    HPI: 20-year-old female patient with past medical history of diabetes, hypertension, hyperlipidemia  And former smoker is here for evaluation prior to mediastinal mass removal   Patient is not very active but denies having any chest pain on exertion  She does get occasional shortness of breath on exertion especially when she is will taking stairs  She also reports tiredness  She denies having any palpitations dizziness or syncope  She does have mild lower extremity edema which he says is chronic and had for last 5-6 years  Patient's diabetes is poorly controlled  Patient did not have any recent stress test     Patient Active Problem List   Diagnosis    Impingement syndrome of right shoulder    Mediastinal mass     Past Medical History:   Diagnosis Date    Diabetes mellitus ([facility] [address] )     Hyperlipidemia     Hypertension     Mediastinal mass     Right shoulder pain      Social History     Social History    Marital status: /Civil Union     Spouse name: N/A    Number of children: N/A    Years of education: N/A     Occupational History    Not on file       Social History Main Topics    Smoking status: Former Smoker     Packs/day: 0 50     Years: 20 00     Quit date: 1987    Smokeless tobacco: Never Used    Alcohol use No    Drug use: No    Sexual activity: Not on file     Other Topics Concern    Not on file     Social History Narrative    No narrative on file      Family History   Problem Relation Age of Onset    Heart disease Mother     Cancer Father     Heart disease Father     Diabetes Brother      Past Surgical History:   Procedure Laterality Date    APPENDECTOMY      CHOLECYSTECTOMY      EYE SURGERY      KNEE SURGERY      THYROID SURGERY         Current Outpatient Prescriptions:     aspirin 81 mg chewable tablet, Chew 81 mg daily, Disp: , Rfl:     BACLOFEN PO, Take 5 mg by mouth daily, Disp: , Rfl:     cyanocobalamin (VITAMIN B-12) 500 mcg tablet, Take 500 mcg by mouth daily, Disp: , Rfl: 1    cyclobenzaprine (FLEXERIL) 5 mg tablet, Take 1 tablet (5 mg total) by mouth 3 (three) times a day as needed for muscle spasms for up to 30 doses, Disp: 30 tablet, Rfl: 0    diclofenac sodium (VOLTAREN) 1 %, Apply 2 g topically 4 (four) times a day for 30 days (Patient taking differently: Apply 2 g topically 4 (four) times a day as needed  ), Disp: 1 Tube, Rfl: 0    Difluprednate (DUREZOL) 0 05 % EMUL, FOR USE AFTER SURGERY INSTILL 1 DROP INTO RIGHT EYE FOUR TIMES DAILY, Disp: , Rfl:     insulin NPH-insulin regular (NovoLIN 70/30) 100 units/mL subcutaneous injection, Inject under the skin 2 (two) times a day before meals, Disp: , Rfl:     levothyroxine 100 mcg tablet, Take 1 tablet (100 mcg total) by mouth daily, Disp: 30 tablet, Rfl: 0    losartan (COZAAR) 50 mg tablet, Take 50 mg by mouth daily, Disp: , Rfl:     metFORMIN (GLUCOPHAGE) 1000 MG tablet, Take 1,000 mg by mouth 2 (two) times a day with meals, Disp: , Rfl:     Nepafenac (ILEVRO) 0 3 % SUSP, INSTILL 1 DROP INTO RIGHT EYE DAILY, Disp: , Rfl:   No Known Allergies  Vitals:    08/27/18 1612   BP: 150/78   Pulse: 77   SpO2: 97%   Weight: 80 2 kg (176 lb 12 8 oz)   Height: 5' 4" (1 626 m)       Labs:  Admission on 07/21/2018, Discharged on 07/21/2018   Component Date Value    WBC 07/21/2018 5 89     RBC 07/21/2018 3 93     Hemoglobin 07/21/2018 11 4*    Hematocrit 07/21/2018 34 9     MCV 07/21/2018 89     MCH 07/21/2018 29 0     MCHC 07/21/2018 32 7     RDW 07/21/2018 15 0     MPV 07/21/2018 9 0     Platelets 75/98/9019 265     nRBC 07/21/2018 0     Neutrophils Relative 07/21/2018 62     Immat GRANS % 07/21/2018 1     Lymphocytes Relative 07/21/2018 27     Monocytes Relative 07/21/2018 8     Eosinophils Relative 07/21/2018 2     Basophils Relative 07/21/2018 0     Neutrophils Absolute 07/21/2018 3 60     Immature Grans Absolute 07/21/2018 0 05     Lymphocytes Absolute 07/21/2018 1 59     Monocytes Absolute 07/21/2018 0 49     Eosinophils Absolute 07/21/2018 0 14     Basophils Absolute 07/21/2018 0 02     Sodium 07/21/2018 140     Potassium 07/21/2018 3 6     Chloride 07/21/2018 105     CO2 07/21/2018 30     ANION GAP 07/21/2018 5     BUN 07/21/2018 11     Creatinine 07/21/2018 0 87     Glucose 07/21/2018 72     Calcium 07/21/2018 8 1*    AST 07/21/2018 20     ALT 07/21/2018 31     Alkaline Phosphatase 07/21/2018 58     Total Protein 07/21/2018 7 3     Albumin 07/21/2018 3 2*    Total Bilirubin 07/21/2018 0 40     eGFR 07/21/2018 67     Lipase 07/21/2018 212     Troponin I 07/21/2018 <0 02     D-Dimer, Quant 07/21/2018 1167*    Color, UA 07/21/2018 Yellow     Clarity, UA 07/21/2018 Clear     Specific Gravity, UA 07/21/2018 1 010     pH, UA 07/21/2018 7 0     Leukocytes, UA 07/21/2018 Negative     Nitrite, UA 07/21/2018 Negative     Protein, UA 07/21/2018 Negative     Glucose, UA 07/21/2018 Negative     Ketones, UA 07/21/2018 Negative     Urobilinogen, UA 07/21/2018 1 0     Bilirubin, UA 07/21/2018 Negative     Blood, UA 07/21/2018 Trace-Intact*    RBC, UA 07/21/2018 0-1*    WBC, UA 07/21/2018 None Seen     Epithelial Cells 07/21/2018 Occasional     Bacteria, UA 07/21/2018 None Seen     Troponin I 07/21/2018 <0 02     Ventricular Rate 07/21/2018 73     Atrial Rate 07/21/2018 73     LA Interval 07/21/2018 176     QRSD Interval 07/21/2018 88     QT Interval 07/21/2018 426     QTC Interval 07/21/2018 469     P Axis 07/21/2018 63     QRS Axis 07/21/2018 50     T Wave Axis 07/21/2018 35     Ventricular Rate 07/21/2018 76     Atrial Rate 07/21/2018 76     OR Interval 07/21/2018 164     QRSD Interval 07/21/2018 88     QT Interval 07/21/2018 400     QTC Interval 07/21/2018 450     P Axis 07/21/2018 55     QRS Axis 07/21/2018 45     T Wave Axis 07/21/2018 27      Imaging: No results found  Review of Systems:  Review of Systems   Constitutional: Negative for diaphoresis and fatigue  HENT: Negative for congestion and facial swelling  Eyes: Negative for photophobia and visual disturbance  Respiratory: Negative for chest tightness and shortness of breath  Cardiovascular: Negative for chest pain, palpitations and leg swelling  Gastrointestinal: Negative for abdominal pain and nausea  Endocrine: Negative for cold intolerance and heat intolerance  Musculoskeletal: Negative for arthralgias and myalgias  Skin: Negative for pallor and rash  Neurological: Negative for dizziness and tremors  Psychiatric/Behavioral: Negative for sleep disturbance  The patient is not nervous/anxious  Physical Exam:  Physical Exam   Constitutional: She is oriented to person, place, and time  She appears well-developed and well-nourished  HENT:   Head: Normocephalic and atraumatic  Eyes: Conjunctivae and EOM are normal  Pupils are equal, round, and reactive to light  Neck: Normal range of motion  Neck supple  No JVD present  No thyromegaly present  Cardiovascular: Normal rate, regular rhythm, S1 normal, S2 normal, normal heart sounds and intact distal pulses  Exam reveals no gallop and no friction rub  No murmur heard  Pulses:       Carotid pulses are 2+ on the right side, and 2+ on the left side  1+ EDEMA   Pulmonary/Chest: Effort normal and breath sounds normal  No respiratory distress  She has no wheezes  She has no rales  Abdominal: Soft  Bowel sounds are normal  She exhibits no distension  There is no tenderness  There is no rebound and no guarding  Musculoskeletal: Normal range of motion  She exhibits no edema  Neurological: She is alert and oriented to person, place, and time  She has normal reflexes  No cranial nerve deficit  Skin: Skin is warm and dry  Psychiatric: She has a normal mood and affect  EKG: NORMAL SINUS RHYTHM, NORMAL AXIS, NORMAL EKG    Discussion/Summary:  1    preop cardiovascular exam:   patient uncontrolled diabetes and she also has other risk factors including hypertension, hyperlipidemia and smoking  She is at significant risk for coronary artery disease  She also has shortness of breath on exertion which could be angina equivalent  I will get nuclear stress test rule out ischemia   she also get echocardiogram to evaluate for any structural heart disease     2  Hypertension,  Systolic blood pressure mildly elevated      Will continue monitor for now if her systolic blood pressure persistently high I will increase dose of losartan to 100 mg      3  Hyperlipidemia, a check lipid profile     further recommendations will follow after nuclear stress test and echocardiogram     follow up with me after surgery for elevated blood pressure

## 2018-08-31 ENCOUNTER — HOSPITAL ENCOUNTER (OUTPATIENT)
Dept: NON INVASIVE DIAGNOSTICS | Facility: CLINIC | Age: 73
Discharge: HOME | End: 2018-08-31
Payer: MEDICARE

## 2018-08-31 DIAGNOSIS — Z01.810 PREOP CARDIOVASCULAR EXAM: ICD-10-CM

## 2018-08-31 DIAGNOSIS — R06.00 DYSPNEA ON EXERTION: ICD-10-CM

## 2018-08-31 LAB
MAX DIASTOLIC BP: 84 MMHG
MAX HEART RATE: 98 BPM
MAX PREDICTED HEART RATE: 149 BPM
MAX. SYSTOLIC BP: 184 MMHG
PROTOCOL NAME: NORMAL
REASON FOR TERMINATION: NORMAL
TARGET HR FORMULA: NORMAL
TIME IN EXERCISE PHASE: NORMAL

## 2018-08-31 PROCEDURE — 78452 HT MUSCLE IMAGE SPECT MULT: CPT

## 2018-08-31 PROCEDURE — 93017 CV STRESS TEST TRACING ONLY: CPT

## 2018-08-31 PROCEDURE — A9502 TC99M TETROFOSMIN: HCPCS

## 2018-08-31 RX ADMIN — REGADENOSON 0.4 MG: 0.08 INJECTION, SOLUTION INTRAVENOUS at 14:08

## 2018-09-01 ENCOUNTER — LAB REQUISITION (OUTPATIENT)
Dept: LAB | Facility: HOSPITAL | Age: 73
End: 2018-09-01
Payer: MEDICARE

## 2018-09-01 ENCOUNTER — APPOINTMENT (OUTPATIENT)
Dept: LAB | Facility: HOSPITAL | Age: 73
End: 2018-09-01
Attending: THORACIC SURGERY (CARDIOTHORACIC VASCULAR SURGERY)
Payer: MEDICARE

## 2018-09-01 ENCOUNTER — TRANSCRIBE ORDERS (OUTPATIENT)
Dept: ADMINISTRATIVE | Facility: HOSPITAL | Age: 73
End: 2018-09-01

## 2018-09-01 DIAGNOSIS — J98.59 MEDIASTINAL MASS: ICD-10-CM

## 2018-09-01 DIAGNOSIS — J98.59 MEDIASTINAL MASS: Primary | ICD-10-CM

## 2018-09-01 DIAGNOSIS — J98.59 OTHER DISEASES OF MEDIASTINUM, NOT ELSEWHERE CLASSIFIED: ICD-10-CM

## 2018-09-01 LAB
ABO GROUP BLD: NORMAL
ANION GAP SERPL CALCULATED.3IONS-SCNC: 6 MMOL/L (ref 4–13)
APTT PPP: 30 SECONDS (ref 24–36)
BASOPHILS # BLD AUTO: 0.02 THOUSANDS/ΜL (ref 0–0.1)
BASOPHILS NFR BLD AUTO: 0 % (ref 0–1)
BLD GP AB SCN SERPL QL: NEGATIVE
BUN SERPL-MCNC: 11 MG/DL (ref 5–25)
CALCIUM SERPL-MCNC: 9 MG/DL (ref 8.3–10.1)
CHLORIDE SERPL-SCNC: 103 MMOL/L (ref 100–108)
CO2 SERPL-SCNC: 30 MMOL/L (ref 21–32)
CREAT SERPL-MCNC: 0.67 MG/DL (ref 0.6–1.3)
EOSINOPHIL # BLD AUTO: 0.13 THOUSAND/ΜL (ref 0–0.61)
EOSINOPHIL NFR BLD AUTO: 3 % (ref 0–6)
ERYTHROCYTE [DISTWIDTH] IN BLOOD BY AUTOMATED COUNT: 14.4 % (ref 11.6–15.1)
GFR SERPL CREATININE-BSD FRML MDRD: 89 ML/MIN/1.73SQ M
GLUCOSE P FAST SERPL-MCNC: 150 MG/DL (ref 65–99)
HCT VFR BLD AUTO: 41.6 % (ref 34.8–46.1)
HGB BLD-MCNC: 13.3 G/DL (ref 11.5–15.4)
IMM GRANULOCYTES # BLD AUTO: 0.02 THOUSAND/UL (ref 0–0.2)
IMM GRANULOCYTES NFR BLD AUTO: 0 % (ref 0–2)
INR PPP: 1.12 (ref 0.86–1.17)
LYMPHOCYTES # BLD AUTO: 1.25 THOUSANDS/ΜL (ref 0.6–4.47)
LYMPHOCYTES NFR BLD AUTO: 28 % (ref 14–44)
MCH RBC QN AUTO: 28.3 PG (ref 26.8–34.3)
MCHC RBC AUTO-ENTMCNC: 32 G/DL (ref 31.4–37.4)
MCV RBC AUTO: 89 FL (ref 82–98)
MONOCYTES # BLD AUTO: 0.32 THOUSAND/ΜL (ref 0.17–1.22)
MONOCYTES NFR BLD AUTO: 7 % (ref 4–12)
NEUTROPHILS # BLD AUTO: 2.8 THOUSANDS/ΜL (ref 1.85–7.62)
NEUTS SEG NFR BLD AUTO: 62 % (ref 43–75)
NRBC BLD AUTO-RTO: 0 /100 WBCS
PLATELET # BLD AUTO: 259 THOUSANDS/UL (ref 149–390)
PMV BLD AUTO: 9.9 FL (ref 8.9–12.7)
POTASSIUM SERPL-SCNC: 3.9 MMOL/L (ref 3.5–5.3)
PROTHROMBIN TIME: 14.3 SECONDS (ref 11.8–14.2)
RBC # BLD AUTO: 4.7 MILLION/UL (ref 3.81–5.12)
RH BLD: NEGATIVE
SODIUM SERPL-SCNC: 139 MMOL/L (ref 136–145)
SPECIMEN EXPIRATION DATE: NORMAL
WBC # BLD AUTO: 4.54 THOUSAND/UL (ref 4.31–10.16)

## 2018-09-01 PROCEDURE — 85025 COMPLETE CBC W/AUTO DIFF WBC: CPT

## 2018-09-01 PROCEDURE — 36415 COLL VENOUS BLD VENIPUNCTURE: CPT

## 2018-09-01 PROCEDURE — 86850 RBC ANTIBODY SCREEN: CPT | Performed by: THORACIC SURGERY (CARDIOTHORACIC VASCULAR SURGERY)

## 2018-09-01 PROCEDURE — 80048 BASIC METABOLIC PNL TOTAL CA: CPT

## 2018-09-01 PROCEDURE — 85730 THROMBOPLASTIN TIME PARTIAL: CPT

## 2018-09-01 PROCEDURE — 83519 RIA NONANTIBODY: CPT

## 2018-09-01 PROCEDURE — 85610 PROTHROMBIN TIME: CPT

## 2018-09-01 PROCEDURE — 86901 BLOOD TYPING SEROLOGIC RH(D): CPT | Performed by: THORACIC SURGERY (CARDIOTHORACIC VASCULAR SURGERY)

## 2018-09-01 PROCEDURE — 86900 BLOOD TYPING SEROLOGIC ABO: CPT | Performed by: THORACIC SURGERY (CARDIOTHORACIC VASCULAR SURGERY)

## 2018-09-01 PROCEDURE — 84238 ASSAY NONENDOCRINE RECEPTOR: CPT

## 2018-09-04 ENCOUNTER — TELEPHONE (OUTPATIENT)
Dept: CARDIOLOGY CLINIC | Facility: CLINIC | Age: 73
End: 2018-09-04

## 2018-09-04 ENCOUNTER — HOSPITAL ENCOUNTER (OUTPATIENT)
Dept: NON INVASIVE DIAGNOSTICS | Facility: CLINIC | Age: 73
Discharge: HOME | End: 2018-09-04
Payer: MEDICARE

## 2018-09-04 ENCOUNTER — DOCUMENTATION (OUTPATIENT)
Dept: CARDIOLOGY CLINIC | Facility: CLINIC | Age: 73
End: 2018-09-04

## 2018-09-04 DIAGNOSIS — Z01.810 PREOP CARDIOVASCULAR EXAM: ICD-10-CM

## 2018-09-04 DIAGNOSIS — E78.2 MIXED HYPERLIPIDEMIA: ICD-10-CM

## 2018-09-04 LAB — ACHR BIND AB SER-SCNC: <0.03 NMOL/L (ref 0–0.24)

## 2018-09-04 PROCEDURE — 93016 CV STRESS TEST SUPVJ ONLY: CPT | Performed by: INTERNAL MEDICINE

## 2018-09-04 PROCEDURE — 93306 TTE W/DOPPLER COMPLETE: CPT

## 2018-09-04 PROCEDURE — 78452 HT MUSCLE IMAGE SPECT MULT: CPT | Performed by: INTERNAL MEDICINE

## 2018-09-04 PROCEDURE — 93306 TTE W/DOPPLER COMPLETE: CPT | Performed by: INTERNAL MEDICINE

## 2018-09-04 PROCEDURE — 93018 CV STRESS TEST I&R ONLY: CPT | Performed by: INTERNAL MEDICINE

## 2018-09-04 NOTE — PROGRESS NOTES
Preop evaluation prior to thoracic mass surgery  Patient had normal nuclear stress test with no reversible ischemia and normal LV systolic function  Given her age and risk factors, she should be at low-to-moderate risk for cardiac events during surgery  Continue low-dose aspirin cara-operatively if possible    Call me if you have any questions

## 2018-09-05 ENCOUNTER — OFFICE VISIT (OUTPATIENT)
Dept: CARDIAC SURGERY | Facility: CLINIC | Age: 73
End: 2018-09-05
Payer: MEDICARE

## 2018-09-05 VITALS
TEMPERATURE: 98 F | BODY MASS INDEX: 30.35 KG/M2 | SYSTOLIC BLOOD PRESSURE: 173 MMHG | HEART RATE: 80 BPM | WEIGHT: 177.8 LBS | DIASTOLIC BLOOD PRESSURE: 74 MMHG | OXYGEN SATURATION: 96 % | HEIGHT: 64 IN

## 2018-09-05 DIAGNOSIS — J98.59 MEDIASTINAL MASS: Primary | ICD-10-CM

## 2018-09-05 PROCEDURE — 99215 OFFICE O/P EST HI 40 MIN: CPT | Performed by: THORACIC SURGERY (CARDIOTHORACIC VASCULAR SURGERY)

## 2018-09-05 NOTE — LETTER
September 5, 2018     Orly Osman, [address] [address]    Patient: Yadi Bobo   YOB: 1945   Date of Visit: 9/5/2018       Dear Dr Marimar Gilmore: Thank you for referring Ambrose Savivonne to me for evaluation  Below are my notes for this consultation  If you have questions, please do not hesitate to call me  I look forward to following your patient along with you  Sincerely,        Renae Gordon MD        CC: No Recipients  Renae Gordon MD  9/5/2018  4:49 PM  Sign at close encounter  Thoracic Consult  Assessment/Plan:   79-year-old female with 3 5 x 2 4 x 2 5 cm anterior mediastinal mass likely a thymoma  Based on imaging studies this is most likely a thymoma  There is a possibility that it is a substernal thyroid that was not fully resected on during her previous thyroidectomy  Her acetylcholine esterase receptor antibodies were negative  Regardless would recommend resection of this anterior mediastinal mass given the possibility of a thymoma with malignant potential   I had a lengthy discussion with the patient, her son, and daughter-in-law about her imaging, indications for surgery, and potential risks us benefits of surgery  Given all of the above they would like to proceed  She was cleared from a cardiology standpoint for surgery  We will schedule her for a robotic assisted resection of an anterior mediastinal mass  I am planning on going doing a left chest approach given that the mass is predominantly left-sided as well as her right arm has limited motion  The patient signed consent in the office for this procedure  This will be scheduled for Friday September 7th  Diagnoses and all orders for this visit:    Mediastinal mass            Thoracic History   Diagnosis:    Procedures/Surgeries:    Pathology:    Adjuvant Therapy:       Subjective:    Patient ID: Yadi Bobo is a 71 y o  female  BRIT Levin is a 71-year-old [place] female with past medical history of hypertension, diabetes mellitus, hyperlipidemia, cataracts, right shoulder pain and a newly diagnosed mediastinal mass  She has presented or year multiple times for right shoulder pain during this workup they found a 3 5 x 2 4 cm anterior mediastinal mass  She followed up in our office previously for this and underwent further laboratory testing as well as preoperative cardiac evaluation  She comes back to our office to discuss surgical intervention  In the preceding week she had a cataract surgery on her left side which was uncomplicated no issues  She also was seen by cardiologist Dr Alexandra Zimmerman  He performed a 8/31/18 myocardial perfusion test as well as a 09/04/2018 echo  Per his report this testing was normal and he felt she was low to moderate risk for cardiac events during surgery  He counseled to continue aspirin perioperatively  The patient was question examined using her daughter-in-law and son as interpreters  She denies any current complaints  She denies any pain, chest pain or shortness of breath  She has a good appetite and denies any dysphagia or voice changes  Otherwise no issues        The following portions of the patient's history were reviewed and updated as appropriate: allergies, current medications, past family history, past medical history, past social history, past surgical history and problem list     Past Medical History:   Diagnosis Date    Diabetes mellitus ([address] )     Hyperlipidemia     Hypertension     Mediastinal mass     Right shoulder pain       Past Surgical History:   Procedure Laterality Date    APPENDECTOMY      CHOLECYSTECTOMY      EYE SURGERY      KNEE SURGERY      THYROID SURGERY        Family History   Problem Relation Age of Onset    Heart disease Mother     Cancer Father     Heart disease Father     Diabetes Brother       Social History     Social History  Marital status: /Civil Union     Spouse name: N/A    Number of children: N/A    Years of education: N/A     Occupational History    Not on file  Social History Main Topics    Smoking status: Former Smoker     Packs/day: 0 50     Years: 20 00     Quit date: 1987    Smokeless tobacco: Never Used    Alcohol use No    Drug use: No    Sexual activity: Not on file     Other Topics Concern    Not on file     Social History Narrative    No narrative on file      Review of Systems   Constitutional: Negative for activity change, appetite change, chills, diaphoresis, fatigue, fever and unexpected weight change  HENT: Negative  Eyes: Positive for discharge, itching and visual disturbance  Respiratory: Negative for apnea, cough, chest tightness, shortness of breath, wheezing and stridor  Cardiovascular: Negative for chest pain, palpitations and leg swelling  Gastrointestinal: Negative for abdominal distention, abdominal pain, blood in stool, constipation, diarrhea, nausea and vomiting  Endocrine: Negative  Genitourinary: Negative  Musculoskeletal: Positive for arthralgias, joint swelling and myalgias  Skin: Negative  Allergic/Immunologic: Negative  Neurological: Negative  Hematological: Negative  Psychiatric/Behavioral: Negative  Objective:   Physical Exam   Constitutional: She is oriented to person, place, and time  She appears well-developed and well-nourished  No distress  HENT:   Head: Normocephalic and atraumatic  Mouth/Throat: Oropharynx is clear and moist  No oropharyngeal exudate  Eyes: Conjunctivae and EOM are normal  Pupils are equal, round, and reactive to light  No scleral icterus  Neck: Normal range of motion  Neck supple  No JVD present  No tracheal deviation present  No thyromegaly present  Transverse cervical scar from likely thyroidectomy    No palpable masses   Cardiovascular: Normal rate, regular rhythm, normal heart sounds and intact distal pulses  Exam reveals no gallop and no friction rub  No murmur heard  Pulmonary/Chest: Effort normal and breath sounds normal  No respiratory distress  She has no wheezes  She has no rales  She exhibits no tenderness  Abdominal: Soft  Bowel sounds are normal  She exhibits no distension and no mass  There is no tenderness  There is no rebound and no guarding  Musculoskeletal: Normal range of motion  She exhibits no edema, tenderness or deformity  Right shoulder discomfort and limited range of motion  Neurological: She is alert and oriented to person, place, and time  Skin: Skin is warm and dry  No rash noted  She is not diaphoretic  No erythema  No pallor  Psychiatric: She has a normal mood and affect  Her behavior is normal  Judgment and thought content normal    Nursing note and vitals reviewed  BP (!) 173/74 (BP Location: Right arm, Patient Position: Sitting, Cuff Size: Adult)   Pulse 80   Temp 98 °F (36 7 °C)   Ht 5' 4" (1 626 m)   Wt 80 6 kg (177 lb 12 8 oz)   SpO2 96%   BMI 30 52 kg/m²        7/21/18 CT chest - MEDIASTINUM AND ROMULO:  There is a 3 5 x 2 4 x 2 5 cm soft tissue mass in the superior aspect of the anterior mediastinum, extending from the sternal notch inferiorly to the manubrium (series 2, image 57 and series 603, image 73)  Differential includes   residual thyroid tissue/retrosternal goiter  Less likely would be thymic lesion or germ cell tumor        Labs - ACHR antibodies negative    Layla Shaikh MD  Thoracic Surgeon

## 2018-09-05 NOTE — PROGRESS NOTES
Thoracic Consult  Assessment/Plan:   76-year-old female with 3 5 x 2 4 x 2 5 cm anterior mediastinal mass likely a thymoma  Based on imaging studies this is most likely a thymoma  There is a possibility that it is a substernal thyroid that was not fully resected on during her previous thyroidectomy  Her acetylcholine esterase receptor antibodies were negative  Regardless would recommend resection of this anterior mediastinal mass given the possibility of a thymoma with malignant potential   I had a lengthy discussion with the patient, her son, and daughter-in-law about her imaging, indications for surgery, and potential risks us benefits of surgery  Given all of the above they would like to proceed  She was cleared from a cardiology standpoint for surgery  We will schedule her for a robotic assisted resection of an anterior mediastinal mass  I am planning on going doing a left chest approach given that the mass is predominantly left-sided as well as her right arm has limited motion  The patient signed consent in the office for this procedure  This will be scheduled for Friday September 7th  Diagnoses and all orders for this visit:    Mediastinal mass            Thoracic History   Diagnosis:    Procedures/Surgeries:    Pathology:    Adjuvant Therapy:       Subjective:    Patient ID: Forrest Barrios is a 71 y o  female  HPI  Ronel Thomas is a 76-year-old [place] female with past medical history of hypertension, diabetes mellitus, hyperlipidemia, cataracts, right shoulder pain and a newly diagnosed mediastinal mass  She has presented or year multiple times for right shoulder pain during this workup they found a 3 5 x 2 4 cm anterior mediastinal mass  She followed up in our office previously for this and underwent further laboratory testing as well as preoperative cardiac evaluation  She comes back to our office to discuss surgical intervention      In the preceding week she had a cataract surgery on her left side which was uncomplicated no issues  She also was seen by cardiologist Dr Hobson Au  He performed a 8/31/18 myocardial perfusion test as well as a 09/04/2018 echo  Per his report this testing was normal and he felt she was low to moderate risk for cardiac events during surgery  He counseled to continue aspirin perioperatively  The patient was question examined using her daughter-in-law and son as interpreters  She denies any current complaints  She denies any pain, chest pain or shortness of breath  She has a good appetite and denies any dysphagia or voice changes  Otherwise no issues  The following portions of the patient's history were reviewed and updated as appropriate: allergies, current medications, past family history, past medical history, past social history, past surgical history and problem list     Past Medical History:   Diagnosis Date    Diabetes mellitus ([address] )     Hyperlipidemia     Hypertension     Mediastinal mass     Right shoulder pain       Past Surgical History:   Procedure Laterality Date    APPENDECTOMY      CHOLECYSTECTOMY      EYE SURGERY      KNEE SURGERY      THYROID SURGERY        Family History   Problem Relation Age of Onset    Heart disease Mother     Cancer Father     Heart disease Father     Diabetes Brother       Social History     Social History    Marital status: /Civil Union     Spouse name: N/A    Number of children: N/A    Years of education: N/A     Occupational History    Not on file       Social History Main Topics    Smoking status: Former Smoker     Packs/day: 0 50     Years: 20 00     Quit date: 1987    Smokeless tobacco: Never Used    Alcohol use No    Drug use: No    Sexual activity: Not on file     Other Topics Concern    Not on file     Social History Narrative    No narrative on file      Review of Systems   Constitutional: Negative for activity change, appetite change, chills, diaphoresis, fatigue, fever and unexpected weight change  HENT: Negative  Eyes: Positive for discharge, itching and visual disturbance  Respiratory: Negative for apnea, cough, chest tightness, shortness of breath, wheezing and stridor  Cardiovascular: Negative for chest pain, palpitations and leg swelling  Gastrointestinal: Negative for abdominal distention, abdominal pain, blood in stool, constipation, diarrhea, nausea and vomiting  Endocrine: Negative  Genitourinary: Negative  Musculoskeletal: Positive for arthralgias, joint swelling and myalgias  Skin: Negative  Allergic/Immunologic: Negative  Neurological: Negative  Hematological: Negative  Psychiatric/Behavioral: Negative  Objective:   Physical Exam   Constitutional: She is oriented to person, place, and time  She appears well-developed and well-nourished  No distress  HENT:   Head: Normocephalic and atraumatic  Mouth/Throat: Oropharynx is clear and moist  No oropharyngeal exudate  Eyes: Conjunctivae and EOM are normal  Pupils are equal, round, and reactive to light  No scleral icterus  Neck: Normal range of motion  Neck supple  No JVD present  No tracheal deviation present  No thyromegaly present  Transverse cervical scar from likely thyroidectomy  No palpable masses   Cardiovascular: Normal rate, regular rhythm, normal heart sounds and intact distal pulses  Exam reveals no gallop and no friction rub  No murmur heard  Pulmonary/Chest: Effort normal and breath sounds normal  No respiratory distress  She has no wheezes  She has no rales  She exhibits no tenderness  Abdominal: Soft  Bowel sounds are normal  She exhibits no distension and no mass  There is no tenderness  There is no rebound and no guarding  Musculoskeletal: Normal range of motion  She exhibits no edema, tenderness or deformity  Right shoulder discomfort and limited range of motion     Neurological: She is alert and oriented to person, place, and time  Skin: Skin is warm and dry  No rash noted  She is not diaphoretic  No erythema  No pallor  Psychiatric: She has a normal mood and affect  Her behavior is normal  Judgment and thought content normal    Nursing note and vitals reviewed  BP (!) 173/74 (BP Location: Right arm, Patient Position: Sitting, Cuff Size: Adult)   Pulse 80   Temp 98 °F (36 7 °C)   Ht 5' 4" (1 626 m)   Wt 80 6 kg (177 lb 12 8 oz)   SpO2 96%   BMI 30 52 kg/m²       7/21/18 CT chest - MEDIASTINUM AND ROMULO:  There is a 3 5 x 2 4 x 2 5 cm soft tissue mass in the superior aspect of the anterior mediastinum, extending from the sternal notch inferiorly to the manubrium (series 2, image 57 and series 603, image 73)  Differential includes   residual thyroid tissue/retrosternal goiter  Less likely would be thymic lesion or germ cell tumor        Labs - ACHR antibodies negative    Yaya Nash MD  Thoracic Surgeon

## 2018-09-06 ENCOUNTER — ANESTHESIA EVENT (OUTPATIENT)
Dept: PERIOP | Facility: HOSPITAL | Age: 73
DRG: 804 | End: 2018-09-06
Payer: MEDICARE

## 2018-09-06 LAB — ACHR BLOCK AB/ACHR TOTAL SFR SER: 21 % (ref 0–25)

## 2018-09-07 ENCOUNTER — ANESTHESIA (OUTPATIENT)
Dept: PERIOP | Facility: HOSPITAL | Age: 73
DRG: 804 | End: 2018-09-07
Payer: MEDICARE

## 2018-09-07 ENCOUNTER — APPOINTMENT (INPATIENT)
Dept: RADIOLOGY | Facility: HOSPITAL | Age: 73
DRG: 804 | End: 2018-09-07
Attending: THORACIC SURGERY (CARDIOTHORACIC VASCULAR SURGERY)
Payer: MEDICARE

## 2018-09-07 ENCOUNTER — HOSPITAL ENCOUNTER (INPATIENT)
Facility: HOSPITAL | Age: 73
LOS: 3 days | Discharge: HOME | DRG: 804 | End: 2018-09-10
Attending: THORACIC SURGERY (CARDIOTHORACIC VASCULAR SURGERY) | Admitting: THORACIC SURGERY (CARDIOTHORACIC VASCULAR SURGERY)
Payer: MEDICARE

## 2018-09-07 DIAGNOSIS — J98.59 MEDIASTINAL MASS: ICD-10-CM

## 2018-09-07 LAB
ACHR MOD AB/ACHR TOTAL SFR SER: 22 % (ref 0–20)
GLUCOSE SERPL-MCNC: 142 MG/DL (ref 65–140)
GLUCOSE SERPL-MCNC: 245 MG/DL (ref 65–140)
GLUCOSE SERPL-MCNC: 259 MG/DL (ref 65–140)
GLUCOSE SERPL-MCNC: 284 MG/DL (ref 65–140)
GLUCOSE SERPL-MCNC: 300 MG/DL (ref 65–140)

## 2018-09-07 PROCEDURE — 60520 REMOVAL OF THYMUS GLAND: CPT | Performed by: PHYSICIAN ASSISTANT

## 2018-09-07 PROCEDURE — 88307 TISSUE EXAM BY PATHOLOGIST: CPT | Performed by: PATHOLOGY

## 2018-09-07 PROCEDURE — 82948 REAGENT STRIP/BLOOD GLUCOSE: CPT

## 2018-09-07 PROCEDURE — 8E0W4CZ ROBOTIC ASSISTED PROCEDURE OF TRUNK REGION, PERCUTANEOUS ENDOSCOPIC APPROACH: ICD-10-PCS | Performed by: THORACIC SURGERY (CARDIOTHORACIC VASCULAR SURGERY)

## 2018-09-07 PROCEDURE — 07TM0ZZ RESECTION OF THYMUS, OPEN APPROACH: ICD-10-PCS | Performed by: THORACIC SURGERY (CARDIOTHORACIC VASCULAR SURGERY)

## 2018-09-07 PROCEDURE — 71045 X-RAY EXAM CHEST 1 VIEW: CPT

## 2018-09-07 PROCEDURE — 60520 REMOVAL OF THYMUS GLAND: CPT | Performed by: THORACIC SURGERY (CARDIOTHORACIC VASCULAR SURGERY)

## 2018-09-07 PROCEDURE — C9290 INJ, BUPIVACAINE LIPOSOME: HCPCS | Performed by: THORACIC SURGERY (CARDIOTHORACIC VASCULAR SURGERY)

## 2018-09-07 PROCEDURE — 0WBC0ZZ EXCISION OF MEDIASTINUM, OPEN APPROACH: ICD-10-PCS | Performed by: THORACIC SURGERY (CARDIOTHORACIC VASCULAR SURGERY)

## 2018-09-07 RX ORDER — METOCLOPRAMIDE HYDROCHLORIDE 5 MG/ML
10 INJECTION INTRAMUSCULAR; INTRAVENOUS ONCE AS NEEDED
Status: DISCONTINUED | OUTPATIENT
Start: 2018-09-07 | End: 2018-09-07 | Stop reason: HOSPADM

## 2018-09-07 RX ORDER — EPHEDRINE SULFATE 50 MG/ML
INJECTION, SOLUTION INTRAVENOUS AS NEEDED
Status: DISCONTINUED | OUTPATIENT
Start: 2018-09-07 | End: 2018-09-07 | Stop reason: SURG

## 2018-09-07 RX ORDER — DIFLUPREDNATE 0.5 MG/ML
1 EMULSION OPHTHALMIC 4 TIMES DAILY
Status: DISCONTINUED | OUTPATIENT
Start: 2018-09-07 | End: 2018-09-08 | Stop reason: RX

## 2018-09-07 RX ORDER — LOSARTAN POTASSIUM 50 MG/1
50 TABLET ORAL DAILY
Status: DISCONTINUED | OUTPATIENT
Start: 2018-09-07 | End: 2018-09-10 | Stop reason: HOSPADM

## 2018-09-07 RX ORDER — PROPOFOL 10 MG/ML
INJECTION, EMULSION INTRAVENOUS AS NEEDED
Status: DISCONTINUED | OUTPATIENT
Start: 2018-09-07 | End: 2018-09-07 | Stop reason: SURG

## 2018-09-07 RX ORDER — ONDANSETRON 2 MG/ML
INJECTION INTRAMUSCULAR; INTRAVENOUS AS NEEDED
Status: DISCONTINUED | OUTPATIENT
Start: 2018-09-07 | End: 2018-09-07 | Stop reason: SURG

## 2018-09-07 RX ORDER — MIDAZOLAM HYDROCHLORIDE 1 MG/ML
INJECTION INTRAMUSCULAR; INTRAVENOUS AS NEEDED
Status: DISCONTINUED | OUTPATIENT
Start: 2018-09-07 | End: 2018-09-07 | Stop reason: SURG

## 2018-09-07 RX ORDER — HYDROMORPHONE HYDROCHLORIDE 2 MG/ML
INJECTION, SOLUTION INTRAMUSCULAR; INTRAVENOUS; SUBCUTANEOUS AS NEEDED
Status: DISCONTINUED | OUTPATIENT
Start: 2018-09-07 | End: 2018-09-07 | Stop reason: SURG

## 2018-09-07 RX ORDER — OXYCODONE HYDROCHLORIDE 5 MG/1
5 TABLET ORAL EVERY 4 HOURS PRN
Status: DISCONTINUED | OUTPATIENT
Start: 2018-09-07 | End: 2018-09-07

## 2018-09-07 RX ORDER — FENTANYL CITRATE/PF 50 MCG/ML
25 SYRINGE (ML) INJECTION
Status: DISCONTINUED | OUTPATIENT
Start: 2018-09-07 | End: 2018-09-07 | Stop reason: HOSPADM

## 2018-09-07 RX ORDER — ONDANSETRON 2 MG/ML
4 INJECTION INTRAMUSCULAR; INTRAVENOUS ONCE AS NEEDED
Status: DISCONTINUED | OUTPATIENT
Start: 2018-09-07 | End: 2018-09-07 | Stop reason: HOSPADM

## 2018-09-07 RX ORDER — ROCURONIUM BROMIDE 10 MG/ML
INJECTION, SOLUTION INTRAVENOUS AS NEEDED
Status: DISCONTINUED | OUTPATIENT
Start: 2018-09-07 | End: 2018-09-07 | Stop reason: SURG

## 2018-09-07 RX ORDER — SODIUM CHLORIDE 9 MG/ML
INJECTION, SOLUTION INTRAVENOUS CONTINUOUS PRN
Status: DISCONTINUED | OUTPATIENT
Start: 2018-09-07 | End: 2018-09-07 | Stop reason: SURG

## 2018-09-07 RX ORDER — MEPERIDINE HYDROCHLORIDE 25 MG/ML
12.5 INJECTION INTRAMUSCULAR; INTRAVENOUS; SUBCUTANEOUS
Status: DISCONTINUED | OUTPATIENT
Start: 2018-09-07 | End: 2018-09-07 | Stop reason: HOSPADM

## 2018-09-07 RX ORDER — INSULIN GLARGINE 100 [IU]/ML
30 INJECTION, SOLUTION SUBCUTANEOUS
Status: DISCONTINUED | OUTPATIENT
Start: 2018-09-07 | End: 2018-09-10 | Stop reason: HOSPADM

## 2018-09-07 RX ORDER — SODIUM CHLORIDE, SODIUM LACTATE, POTASSIUM CHLORIDE, CALCIUM CHLORIDE 600; 310; 30; 20 MG/100ML; MG/100ML; MG/100ML; MG/100ML
60 INJECTION, SOLUTION INTRAVENOUS CONTINUOUS
Status: DISCONTINUED | OUTPATIENT
Start: 2018-09-07 | End: 2018-09-08

## 2018-09-07 RX ORDER — BUPIVACAINE HYDROCHLORIDE 2.5 MG/ML
INJECTION, SOLUTION INFILTRATION; PERINEURAL AS NEEDED
Status: DISCONTINUED | OUTPATIENT
Start: 2018-09-07 | End: 2018-09-07 | Stop reason: HOSPADM

## 2018-09-07 RX ORDER — ASPIRIN 81 MG/1
81 TABLET, CHEWABLE ORAL DAILY
Status: DISCONTINUED | OUTPATIENT
Start: 2018-09-08 | End: 2018-09-10 | Stop reason: HOSPADM

## 2018-09-07 RX ORDER — HEPARIN SODIUM 5000 [USP'U]/ML
5000 INJECTION, SOLUTION INTRAVENOUS; SUBCUTANEOUS EVERY 8 HOURS SCHEDULED
Status: DISCONTINUED | OUTPATIENT
Start: 2018-09-08 | End: 2018-09-10 | Stop reason: HOSPADM

## 2018-09-07 RX ORDER — LEVOTHYROXINE SODIUM 0.1 MG/1
100 TABLET ORAL
Status: DISCONTINUED | OUTPATIENT
Start: 2018-09-08 | End: 2018-09-10 | Stop reason: HOSPADM

## 2018-09-07 RX ORDER — ACETAMINOPHEN 325 MG/1
650 TABLET ORAL EVERY 6 HOURS SCHEDULED
Status: DISCONTINUED | OUTPATIENT
Start: 2018-09-07 | End: 2018-09-10 | Stop reason: HOSPADM

## 2018-09-07 RX ORDER — MAGNESIUM HYDROXIDE 1200 MG/15ML
LIQUID ORAL AS NEEDED
Status: DISCONTINUED | OUTPATIENT
Start: 2018-09-07 | End: 2018-09-07 | Stop reason: HOSPADM

## 2018-09-07 RX ORDER — INSULIN GLARGINE 100 [IU]/ML
30 INJECTION, SOLUTION SUBCUTANEOUS
COMMUNITY
End: 2019-07-03

## 2018-09-07 RX ORDER — SODIUM CHLORIDE, SODIUM LACTATE, POTASSIUM CHLORIDE, CALCIUM CHLORIDE 600; 310; 30; 20 MG/100ML; MG/100ML; MG/100ML; MG/100ML
INJECTION, SOLUTION INTRAVENOUS CONTINUOUS PRN
Status: DISCONTINUED | OUTPATIENT
Start: 2018-09-07 | End: 2018-09-07 | Stop reason: SURG

## 2018-09-07 RX ORDER — PROPOFOL 10 MG/ML
INJECTION, EMULSION INTRAVENOUS CONTINUOUS PRN
Status: DISCONTINUED | OUTPATIENT
Start: 2018-09-07 | End: 2018-09-07 | Stop reason: SURG

## 2018-09-07 RX ORDER — OXYCODONE HYDROCHLORIDE 5 MG/1
5 TABLET ORAL EVERY 4 HOURS PRN
Status: DISCONTINUED | OUTPATIENT
Start: 2018-09-07 | End: 2018-09-10 | Stop reason: HOSPADM

## 2018-09-07 RX ORDER — LIDOCAINE HYDROCHLORIDE 10 MG/ML
INJECTION, SOLUTION INFILTRATION; PERINEURAL AS NEEDED
Status: DISCONTINUED | OUTPATIENT
Start: 2018-09-07 | End: 2018-09-07 | Stop reason: SURG

## 2018-09-07 RX ORDER — FENTANYL CITRATE 50 UG/ML
INJECTION, SOLUTION INTRAMUSCULAR; INTRAVENOUS AS NEEDED
Status: DISCONTINUED | OUTPATIENT
Start: 2018-09-07 | End: 2018-09-07 | Stop reason: SURG

## 2018-09-07 RX ORDER — 0.9 % SODIUM CHLORIDE 0.9 %
VIAL (ML) INJECTION AS NEEDED
Status: DISCONTINUED | OUTPATIENT
Start: 2018-09-07 | End: 2018-09-07 | Stop reason: HOSPADM

## 2018-09-07 RX ORDER — GUAIFENESIN 100 MG/5ML
200 SOLUTION ORAL EVERY 4 HOURS PRN
Status: DISCONTINUED | OUTPATIENT
Start: 2018-09-07 | End: 2018-09-07

## 2018-09-07 RX ORDER — CEFAZOLIN SODIUM 1 G/3ML
INJECTION, POWDER, FOR SOLUTION INTRAMUSCULAR; INTRAVENOUS AS NEEDED
Status: DISCONTINUED | OUTPATIENT
Start: 2018-09-07 | End: 2018-09-07 | Stop reason: SURG

## 2018-09-07 RX ORDER — SODIUM CHLORIDE, SODIUM LACTATE, POTASSIUM CHLORIDE, CALCIUM CHLORIDE 600; 310; 30; 20 MG/100ML; MG/100ML; MG/100ML; MG/100ML
50 INJECTION, SOLUTION INTRAVENOUS CONTINUOUS
Status: CANCELLED | OUTPATIENT
Start: 2018-09-07

## 2018-09-07 RX ORDER — OXYCODONE HYDROCHLORIDE 10 MG/1
10 TABLET ORAL EVERY 4 HOURS PRN
Status: DISCONTINUED | OUTPATIENT
Start: 2018-09-07 | End: 2018-09-10 | Stop reason: HOSPADM

## 2018-09-07 RX ORDER — KETAMINE HYDROCHLORIDE 50 MG/ML
INJECTION, SOLUTION, CONCENTRATE INTRAMUSCULAR; INTRAVENOUS AS NEEDED
Status: DISCONTINUED | OUTPATIENT
Start: 2018-09-07 | End: 2018-09-07 | Stop reason: SURG

## 2018-09-07 RX ADMIN — INSULIN LISPRO 2 UNITS: 100 INJECTION, SOLUTION INTRAVENOUS; SUBCUTANEOUS at 15:03

## 2018-09-07 RX ADMIN — CEFAZOLIN 2000 MG: 1 INJECTION, POWDER, FOR SOLUTION INTRAVENOUS at 08:15

## 2018-09-07 RX ADMIN — EPHEDRINE SULFATE 5 MG: 50 INJECTION, SOLUTION INTRAMUSCULAR; INTRAVENOUS; SUBCUTANEOUS at 10:09

## 2018-09-07 RX ADMIN — LOSARTAN POTASSIUM 50 MG: 50 TABLET ORAL at 17:10

## 2018-09-07 RX ADMIN — ROCURONIUM BROMIDE 10 MG: 10 INJECTION INTRAVENOUS at 09:47

## 2018-09-07 RX ADMIN — EPHEDRINE SULFATE 5 MG: 50 INJECTION, SOLUTION INTRAMUSCULAR; INTRAVENOUS; SUBCUTANEOUS at 09:16

## 2018-09-07 RX ADMIN — FENTANYL CITRATE 50 MCG: 50 INJECTION, SOLUTION INTRAMUSCULAR; INTRAVENOUS at 08:40

## 2018-09-07 RX ADMIN — MIDAZOLAM 2 MG: 1 INJECTION INTRAMUSCULAR; INTRAVENOUS at 07:27

## 2018-09-07 RX ADMIN — Medication 1 MCG/KG/HR: at 08:40

## 2018-09-07 RX ADMIN — HYDROMORPHONE HYDROCHLORIDE 0.5 MG: 2 INJECTION, SOLUTION INTRAMUSCULAR; INTRAVENOUS; SUBCUTANEOUS at 10:57

## 2018-09-07 RX ADMIN — FENTANYL CITRATE 25 MCG: 50 INJECTION, SOLUTION INTRAMUSCULAR; INTRAVENOUS at 14:06

## 2018-09-07 RX ADMIN — ROCURONIUM BROMIDE 20 MG: 10 INJECTION INTRAVENOUS at 08:43

## 2018-09-07 RX ADMIN — ACETAMINOPHEN 650 MG: 325 TABLET, FILM COATED ORAL at 17:10

## 2018-09-07 RX ADMIN — OXYCODONE HYDROCHLORIDE 5 MG: 5 TABLET ORAL at 17:10

## 2018-09-07 RX ADMIN — INSULIN GLARGINE 30 UNITS: 100 INJECTION, SOLUTION SUBCUTANEOUS at 21:49

## 2018-09-07 RX ADMIN — SODIUM CHLORIDE: 0.9 INJECTION, SOLUTION INTRAVENOUS at 07:50

## 2018-09-07 RX ADMIN — FENTANYL CITRATE 50 MCG: 50 INJECTION, SOLUTION INTRAMUSCULAR; INTRAVENOUS at 07:43

## 2018-09-07 RX ADMIN — KETAMINE HYDROCHLORIDE 10 MG: 50 INJECTION, SOLUTION INTRAMUSCULAR; INTRAVENOUS at 09:24

## 2018-09-07 RX ADMIN — FENTANYL CITRATE 25 MCG: 50 INJECTION, SOLUTION INTRAMUSCULAR; INTRAVENOUS at 13:08

## 2018-09-07 RX ADMIN — PROPOFOL 120 MG: 10 INJECTION, EMULSION INTRAVENOUS at 07:41

## 2018-09-07 RX ADMIN — ONDANSETRON 4 MG: 2 INJECTION INTRAMUSCULAR; INTRAVENOUS at 11:26

## 2018-09-07 RX ADMIN — PROPOFOL 100 MCG/KG/MIN: 10 INJECTION, EMULSION INTRAVENOUS at 08:05

## 2018-09-07 RX ADMIN — HYDROMORPHONE HYDROCHLORIDE 0.2 MG: 1 INJECTION, SOLUTION INTRAMUSCULAR; INTRAVENOUS; SUBCUTANEOUS at 14:12

## 2018-09-07 RX ADMIN — SODIUM CHLORIDE, SODIUM LACTATE, POTASSIUM CHLORIDE, AND CALCIUM CHLORIDE: .6; .31; .03; .02 INJECTION, SOLUTION INTRAVENOUS at 07:15

## 2018-09-07 RX ADMIN — KETAMINE HYDROCHLORIDE 40 MG: 50 INJECTION, SOLUTION INTRAMUSCULAR; INTRAVENOUS at 08:30

## 2018-09-07 RX ADMIN — DEXAMETHASONE SODIUM PHOSPHATE 10 MG: 10 INJECTION INTRAMUSCULAR; INTRAVENOUS at 08:05

## 2018-09-07 RX ADMIN — FENTANYL CITRATE 25 MCG: 50 INJECTION, SOLUTION INTRAMUSCULAR; INTRAVENOUS at 14:02

## 2018-09-07 RX ADMIN — ROCURONIUM BROMIDE 10 MG: 10 INJECTION INTRAVENOUS at 10:28

## 2018-09-07 RX ADMIN — OXYCODONE HYDROCHLORIDE 10 MG: 10 TABLET ORAL at 21:48

## 2018-09-07 RX ADMIN — LIDOCAINE HYDROCHLORIDE 50 MG: 10 INJECTION, SOLUTION INFILTRATION; PERINEURAL at 07:41

## 2018-09-07 RX ADMIN — ROCURONIUM BROMIDE 50 MG: 10 INJECTION INTRAVENOUS at 07:41

## 2018-09-07 RX ADMIN — SUGAMMADEX 325 MG: 100 INJECTION, SOLUTION INTRAVENOUS at 10:44

## 2018-09-07 RX ADMIN — EPHEDRINE SULFATE 5 MG: 50 INJECTION, SOLUTION INTRAMUSCULAR; INTRAVENOUS; SUBCUTANEOUS at 09:42

## 2018-09-07 RX ADMIN — FENTANYL CITRATE 50 MCG: 50 INJECTION, SOLUTION INTRAMUSCULAR; INTRAVENOUS at 07:41

## 2018-09-07 RX ADMIN — FENTANYL CITRATE 50 MCG: 50 INJECTION, SOLUTION INTRAMUSCULAR; INTRAVENOUS at 09:59

## 2018-09-07 NOTE — INTERVAL H&P NOTE
H&P reviewed  After examining the patient I find no changes in the patients condition since the H&P had been written      Shirley Tenorio MD

## 2018-09-07 NOTE — ANESTHESIA PROCEDURE NOTES
Arterial Line Insertion  Date/Time: 9/7/2018 8:00 AM  Performed by: Tamara Zepeda by: Fuad David   Consent: Verbal consent obtained  Written consent obtained  Risks and benefits: risks, benefits and alternatives were discussed  Consent given by: patient  Patient understanding: patient states understanding of the procedure being performed  Patient consent: the patient's understanding of the procedure matches consent given  Procedure consent: procedure consent matches procedure scheduled  Relevant documents: relevant documents present and verified  Test results: test results available and properly labeled  Site marked: the operative site was not marked  Radiology Images: Radiology Images displayed and confirmed  If images not available, report reviewed  Required items: required blood products, implants, devices, and special equipment available  Patient identity confirmed: verbally with patient, arm band, provided demographic data and hospital-assigned identification number  Time out: Immediately prior to procedure a "time out" was called to verify the correct patient, procedure, equipment, support staff and site/side marked as required  Preparation: Patient was prepped and draped in the usual sterile fashion  Indications: hemodynamic monitoring  Orientation:  Right  Location: radial artery  Anesthesia method: Performed under GA    Procedure Details:  Gilberto's test normal: yes  Needle gauge: 20  Seldinger technique: Seldinger technique used  Number of attempts: 1    Post-procedure:  Post-procedure: dressing applied  Waveform: good waveform  Post-procedure CNS: normal  Patient tolerance: Patient tolerated the procedure well with no immediate complications

## 2018-09-07 NOTE — ANESTHESIA POSTPROCEDURE EVALUATION
Post-Op Assessment Note      CV Status:  Stable    Post-procedure mental status: sleeping  Hydration Status:  Euvolemic    PONV Controlled:  Controlled    Airway Patency:  Patent    Post Op Vitals Reviewed: Yes          Staff: CRNA, Anesthesiologist       Comments: Pt resting comfortably, VSS  No signs  of arway obstruction            /66 (09/07/18 1203)    Temp (!) 97 1 °F (36 2 °C) (09/07/18 1203)    Pulse 78 (09/07/18 1203)   Resp 18 (09/07/18 1203)    SpO2 100 % (09/07/18 1203)

## 2018-09-07 NOTE — H&P (VIEW-ONLY)
Thoracic Consult  Assessment/Plan:   61-year-old female with 3 5 x 2 4 x 2 5 cm anterior mediastinal mass likely a thymoma  Based on imaging studies this is most likely a thymoma  There is a possibility that it is a substernal thyroid that was not fully resected on during her previous thyroidectomy  Her acetylcholine esterase receptor antibodies were negative  Regardless would recommend resection of this anterior mediastinal mass given the possibility of a thymoma with malignant potential   I had a lengthy discussion with the patient, her son, and daughter-in-law about her imaging, indications for surgery, and potential risks us benefits of surgery  Given all of the above they would like to proceed  She was cleared from a cardiology standpoint for surgery  We will schedule her for a robotic assisted resection of an anterior mediastinal mass  I am planning on going doing a left chest approach given that the mass is predominantly left-sided as well as her right arm has limited motion  The patient signed consent in the office for this procedure  This will be scheduled for Friday September 7th  Diagnoses and all orders for this visit:    Mediastinal mass            Thoracic History   Diagnosis:    Procedures/Surgeries:    Pathology:    Adjuvant Therapy:       Subjective:    Patient ID: Suha Beltran is a 71 y o  female  HPI  Martha Acuna is a 61-year-old [place] female with past medical history of hypertension, diabetes mellitus, hyperlipidemia, cataracts, right shoulder pain and a newly diagnosed mediastinal mass  She has presented or year multiple times for right shoulder pain during this workup they found a 3 5 x 2 4 cm anterior mediastinal mass  She followed up in our office previously for this and underwent further laboratory testing as well as preoperative cardiac evaluation  She comes back to our office to discuss surgical intervention      In the preceding week she had a cataract surgery on her left side which was uncomplicated no issues  She also was seen by cardiologist Dr Rodriguez Blood  He performed a 8/31/18 myocardial perfusion test as well as a 09/04/2018 echo  Per his report this testing was normal and he felt she was low to moderate risk for cardiac events during surgery  He counseled to continue aspirin perioperatively  The patient was question examined using her daughter-in-law and son as interpreters  She denies any current complaints  She denies any pain, chest pain or shortness of breath  She has a good appetite and denies any dysphagia or voice changes  Otherwise no issues  The following portions of the patient's history were reviewed and updated as appropriate: allergies, current medications, past family history, past medical history, past social history, past surgical history and problem list     Past Medical History:   Diagnosis Date    Diabetes mellitus ([address] )     Hyperlipidemia     Hypertension     Mediastinal mass     Right shoulder pain       Past Surgical History:   Procedure Laterality Date    APPENDECTOMY      CHOLECYSTECTOMY      EYE SURGERY      KNEE SURGERY      THYROID SURGERY        Family History   Problem Relation Age of Onset    Heart disease Mother     Cancer Father     Heart disease Father     Diabetes Brother       Social History     Social History    Marital status: /Civil Union     Spouse name: N/A    Number of children: N/A    Years of education: N/A     Occupational History    Not on file       Social History Main Topics    Smoking status: Former Smoker     Packs/day: 0 50     Years: 20 00     Quit date: 1987    Smokeless tobacco: Never Used    Alcohol use No    Drug use: No    Sexual activity: Not on file     Other Topics Concern    Not on file     Social History Narrative    No narrative on file      Review of Systems   Constitutional: Negative for activity change, appetite change, chills, diaphoresis, fatigue, fever and unexpected weight change  HENT: Negative  Eyes: Positive for discharge, itching and visual disturbance  Respiratory: Negative for apnea, cough, chest tightness, shortness of breath, wheezing and stridor  Cardiovascular: Negative for chest pain, palpitations and leg swelling  Gastrointestinal: Negative for abdominal distention, abdominal pain, blood in stool, constipation, diarrhea, nausea and vomiting  Endocrine: Negative  Genitourinary: Negative  Musculoskeletal: Positive for arthralgias, joint swelling and myalgias  Skin: Negative  Allergic/Immunologic: Negative  Neurological: Negative  Hematological: Negative  Psychiatric/Behavioral: Negative  Objective:   Physical Exam   Constitutional: She is oriented to person, place, and time  She appears well-developed and well-nourished  No distress  HENT:   Head: Normocephalic and atraumatic  Mouth/Throat: Oropharynx is clear and moist  No oropharyngeal exudate  Eyes: Conjunctivae and EOM are normal  Pupils are equal, round, and reactive to light  No scleral icterus  Neck: Normal range of motion  Neck supple  No JVD present  No tracheal deviation present  No thyromegaly present  Transverse cervical scar from likely thyroidectomy  No palpable masses   Cardiovascular: Normal rate, regular rhythm, normal heart sounds and intact distal pulses  Exam reveals no gallop and no friction rub  No murmur heard  Pulmonary/Chest: Effort normal and breath sounds normal  No respiratory distress  She has no wheezes  She has no rales  She exhibits no tenderness  Abdominal: Soft  Bowel sounds are normal  She exhibits no distension and no mass  There is no tenderness  There is no rebound and no guarding  Musculoskeletal: Normal range of motion  She exhibits no edema, tenderness or deformity  Right shoulder discomfort and limited range of motion     Neurological: She is alert and oriented to person, place, and time  Skin: Skin is warm and dry  No rash noted  She is not diaphoretic  No erythema  No pallor  Psychiatric: She has a normal mood and affect  Her behavior is normal  Judgment and thought content normal    Nursing note and vitals reviewed  BP (!) 173/74 (BP Location: Right arm, Patient Position: Sitting, Cuff Size: Adult)   Pulse 80   Temp 98 °F (36 7 °C)   Ht 5' 4" (1 626 m)   Wt 80 6 kg (177 lb 12 8 oz)   SpO2 96%   BMI 30 52 kg/m²       7/21/18 CT chest - MEDIASTINUM AND ROMULO:  There is a 3 5 x 2 4 x 2 5 cm soft tissue mass in the superior aspect of the anterior mediastinum, extending from the sternal notch inferiorly to the manubrium (series 2, image 57 and series 603, image 73)  Differential includes   residual thyroid tissue/retrosternal goiter  Less likely would be thymic lesion or germ cell tumor        Labs - ACHR antibodies negative    Howie Petit MD  Thoracic Surgeon

## 2018-09-07 NOTE — INTERIM OP NOTE
THYMECTOMY WITH ROBOTICS, THYMECTOMY  Postoperative Note  PATIENT NAME: Casa Chung  : 1945  MRN: [id number]  [facility] OR ROOM 14    Surgery Date: 2018    Preop Diagnosis:  Mediastinal mass [J98 59]    Post-Op Diagnosis Codes:     * Mediastinal mass [J98 59]    Procedure(s) (LRB):  THYMECTOMY WITH ROBOTICS (N/A)  THYMECTOMY (N/A)     Robotic assisted total thymectomy, cervical incision for specimen removal    Surgeon(s) and Role:     * Layla Shaikh MD - Primary     * Stephon Mackay PA-C - Assisting     * Yvonne Cai MD - Assisting    Specimens:  ID Type Source Tests Collected by Time Destination   1 : [address], MD 2018 1124        Estimated Blood Loss:   Minimal    Anesthesia Type:   General     Findings:   High anterior mediastinal mass, attached to thymus, encapsulated  ? Thymic mass vs  Residual thyroid tissue      Complications:   None    SIGNATURE: Layla Shaikh MD   DATE: 2018   TIME: 11:37 AM

## 2018-09-07 NOTE — ANESTHESIA PREPROCEDURE EVALUATION
Review of Systems/Medical History  Patient summary reviewed  Chart reviewed      Cardiovascular  Exercise tolerance (METS): <4,  Hyperlipidemia, Hypertension controlled,   Comment: Normal stress test 8/31/18, TTE 9/4/18 showed grade 2 diastolic dysfunction, normal systolic function, no valvulopathies,  Pulmonary  Not a smoker , Shortness of breath,   Comment: Former smoker, SOB with exertion not cardiogenic in nature, none when lying flat     GI/Hepatic  Negative GI/hepatic ROS               Endo/Other  Diabetes type 2 Insulin, History of thyroid disease , hypothyroidism,   Comment: Took NPH last at 630 pm with meal    GYN  Negative gynecology ROS          Hematology  Anemia ,     Musculoskeletal    Comment: Right should rotator cuff tear with associated pain, able to fully internally and externally rotate      Neurology  Negative neurology ROS      Psychology   Negative psychology ROS              Physical Exam    Airway    Mallampati score: I  TM Distance: >3 FB  Neck ROM: full     Dental       Cardiovascular      Pulmonary      Other Findings  Poor dentition, white flakey rash on nose, tolerated lying flat > 5 min without respiratory distress, no palpable masses above sternal notch, trachea midline      Anesthesia Plan  ASA Score- 3     Anesthesia Type- general with ASA Monitors  Additional Monitors: arterial line  Airway Plan: ETT  Plan Factors- Instructed to abstain from smoking on day of procedure: former smoker, quite 1987  Mahamed Madrigal Patient did not smoke on day of surgery  Induction- intravenous  Postoperative Plan- Plan for postoperative opioid use  Planned trial extubation    Informed Consent- Anesthetic plan and risks discussed with patient and daughter  I personally reviewed this patient with the CRNA  Discussed and agreed on the Anesthesia Plan with the CRNA  Mahamed Madrigal

## 2018-09-07 NOTE — PERIOPERATIVE NURSING NOTE
Pt comfortable, hemodynamically stable, incisions well approximated, no swelling or hematoma at neck  Incision  Multiple family members in to see patient  Gerber site clear, no s/s of circulatory compromise

## 2018-09-07 NOTE — OP NOTE
OPERATIVE REPORT  PATIENT NAME: Caridad Kuhn    :    MRN: [id number]  Pt Location: [facility] OR ROOM 14    SURGERY DATE: 2018    Surgeon(s) and Role:     * Freya Moran MD - Primary     * Arpan Álvarez PA-C - Assisting     * Bryan Aavlos MD - Observer    Preop Diagnosis:  Mediastinal mass [J98 59]    Post-Op Diagnosis Codes:     * Mediastinal mass [J98 59]    Procedure(s) (LRB):  ROBOTIC ASSISTED TOTAL THYMECTOMY (Left)  TRANSVERSE CERVICAL INCISION TO EXTRACT SPECIMENT (N/A)    Specimen(s):  ID Type Source Tests Collected by Time Destination   1 : THYMECTOMY AND ANTERIOR MEDIASTINAL MASS Tissue Thymus TISSUE EXAM Freya Moran MD 2018 1124        Estimated Blood Loss:   Minimal    Drains:  Closed/Suction Drain Left Chest Bulb 24 Fr  (Active)   Site Description Unable to view 2018 12:15 PM   Dressing Status Clean;Dry; Intact 2018 12:15 PM   Drainage Appearance Bloody 2018 12:15 PM   Status To bulb suction 2018 12:15 PM   Number of days: 0       Anesthesia Type:   General    Operative Indications:  Mediastinal mass [J98 59]  70 yo F with 3 5 x 2 4 x 2 5 cm anterior mediastinal mass found on workup for right shoulder pain  Concern for thymoma versus mediastinal thyroid goiter  Operative Findings:  Well circumscribed 3cm anterior mediastinal mass attached the the right superior pole of the thymus  Mass well above the innominate vein, inaccessible from thoracic approach  Complications:   None    Procedure and Technique:  After obtaining informed consent from the patient, they were transferred to the operating room and placed supine on the operating table  Bilateral SCDs were placed and turned on prior to induction of general anesthesia  General anesthesia was then induced and a double-lumen endotracheal tube was placed without incident  Positioning of the double-lumen tube was verified with a pediatric bronchoscope    The patient was then positioned with the right arm at her side, a bump over under the left chest and her left arm in a padded sling  All bony prominences were padded and checked  Positioning of double-lumen endotracheal tube was verified at this time  Next a formal time-out was called verifying patient , date of birth, procedure, consent, antibiotics, beta-blocker as indicated, anticipated specimens with handling, SCD use and other special considerations  The left chest was then prepped and draped in the standard sterile fashion  Anesthesia clamped and placed suction to the left lobe at this time to help desufflate  Bony landmarks were identified and planned incisions were mapped out  An 8 mm incision was made in the anterior axillary line below the inframammary fold  The robotic port was inserted without complication and the camera was inserted verifying that we are in the thoracic cavity  Insufflation was initiated to 8 mm of mercury  A thorough thoracoscopy was carried out at this time  There were no significant adhesions  Next an intercostal nerve block was performed using 60 mL of a mixture 1/2 percent Marcaine, normal saline and liposomal bupivacaine (Exparel)  Rib spaces 2 through 10 were identified and using a percutaneous approach a block was placed into each of these ribs spaces with approximately 5mL of this mixture  The remainder of the robotic ports were placed at this time  An 8 mm incision was made anteriorly 1 handbreadth anterior of the camera port in the same intercostal space and the port was inserted  A 2nd 8mm working port incision was made superiorly along the anterior axillary line (approximately 4th interspace) under direct visualization  Finally a 12 mm assistant port was placed between the camera and the anterior most port  The robot was docked at this time    A caudier forceps was inserted into arm 2, [place] bipolar grasper into arm 4 and the camera into arm 3  I un-scrubbed at this time and went to the console  We started by identifying the left phrenic nerve  The pericardial fat and thymus was grasped medially to the phrenic nerve staying about 5 mm away from the nerve  We opened up the pleura along this line and dissected this off of the pericardium  We continued our dissection superiorly towards the sternal notch  We then dissected the thymus anteriorly off of the posterior aspect of the sternum using bipolar cautery  This was continued down to the inferior most aspect of the thymus gland  We then fully dissected the thymus off of the pericardium moving towards the right chest   My bedside assistant this point grafts the specimen retracting into the left chest allowing us to dissect into the right pleural space and identify the right phrenic nerve  Care was taken to identify and avoid the right phrenic nerve  We continued our thymus dissection off of the pericardium back up towards the superior vena cava  Once this was completed my bedside assistant grasped the specimen pulling down towards the feet and the thymus and mass were dissected off of the innominate vein  Multiple small venous branches were identified on were cauterized with bipolar cautery  As the thymus and mass were dissected off of the innominate vein we entered higher up into the neck  We were clearly able to identify a well-circumscribed mass approximately the size of a ping-pong ball  With gentle traction we began circumferential dissection of this mass off of a vascular structures in the neck  At this point we were reaching the limits of our robotic dissection  Week were above the sternal notch and could no longer safely visualize up into the neck  The decision was made at this point to perform a transverse cervical extension, extracting this specimen through the neck  The left chest was thoroughly explored, irrigated and aspirated dry  There was no appreciable bleeding seen     A 24 Arabic channel drain was inserted through the assistant incision and draped over the anterior mediastinum going into the right and left pleural spaces  This was secured in place with a drain stitch  All robotic ports were removed under direct visualization and inspected for bleeding  Once satisfied with hemostasis, a 29 Maltese straight chest tube was placed through the camera port and the left upper lobe was then insufflated by anesthesia  This came up without incident and the camera was removed at this time  The robotic ports were closed with 3-0 Vicryl in the soft tissue and 4 0 Monocryl for skin  Surgical glue was applied to all incisions  The channel drain was placed to bulb suction  The surgical drapes were broken down at this time and the patient was repositioned  The left side bump was removed and a shoulder roll was placed with the patient in full neck extension  Her left arm was again talked and all bony prominence were checked  The neck and superior aspect the chest were prepped with ChloraPrep and redraped in standard sterile fashion  A secondary time-out was performed at this time verifying additional procedure  Prior to beginning this aspect of the case I on scrubbed and spoke with the patient's family updating them on our progress and plan  A 5 cm transverse cervical incision was then made in her previous thyroidectomy incision  Soft tissue in platysma were divided with electrocautery and small flaps were made  Next we in countered the bilateral strap muscles and these were split in the midline  Upon splitting the strap muscles the mass was directly in countered and visualized  We circumferentially completed the dissection dissecting this mass off of the cervical trachea  Once all cervical attachments were freed up the mass and remainder of the thymus gland were easily pulled up through the neck incision  On inspection this mass was contiguous with the right superior horn of the thymus    It was well-circumscribed and a tan pink colored tissue  It was firm  This was sent for permanent pathology  The neck was inspected and no appreciable blue bleeding was seen  We irrigated out the dissection area and cauterize any visible bleeding  We then closed the neck in layers with 3 0 Vicryl in the strap muscles and 3 0 Vicryl in the platysma and soft tissue  The skin was run closed with a running 4 Monocryl suture  Surgical glue was applied to the incision  All needle, instrument, and sponge counts were correct at the conclusion of the case  The patient extubated in the operating room and was transferred safely to the stretcher  There were transferred to the PACU in stable condition  Jack MILTON was scrubbed as a bedside assistant for the entirety of the procedure as no other qualified assistant or general surgery resident was available       I was present for the entire procedure    Patient Disposition:  PACU     SIGNATURE: Nora Cole MD  DATE: September 7, 2018  TIME: 1:07 PM

## 2018-09-07 NOTE — PERIOPERATIVE NURSING NOTE
Spoke with Dr Lorena Flores regarding pts BP ()) reported that her BP has been slowly rising over p;ast hour  His plan is to continue to monitor pt for next hour and if still elevated to treat

## 2018-09-08 LAB
ALBUMIN SERPL BCP-MCNC: 3.1 G/DL (ref 3.5–5)
ALP SERPL-CCNC: 60 U/L (ref 46–116)
ALT SERPL W P-5'-P-CCNC: 27 U/L (ref 12–78)
ANION GAP SERPL CALCULATED.3IONS-SCNC: 6 MMOL/L (ref 4–13)
AST SERPL W P-5'-P-CCNC: 20 U/L (ref 5–45)
BASOPHILS # BLD AUTO: 0.01 THOUSANDS/ΜL (ref 0–0.1)
BASOPHILS NFR BLD AUTO: 0 % (ref 0–1)
BILIRUB SERPL-MCNC: 0.55 MG/DL (ref 0.2–1)
BUN SERPL-MCNC: 11 MG/DL (ref 5–25)
CALCIUM SERPL-MCNC: 7.9 MG/DL (ref 8.3–10.1)
CHLORIDE SERPL-SCNC: 100 MMOL/L (ref 100–108)
CO2 SERPL-SCNC: 26 MMOL/L (ref 21–32)
CREAT SERPL-MCNC: 0.67 MG/DL (ref 0.6–1.3)
EOSINOPHIL # BLD AUTO: 0.01 THOUSAND/ΜL (ref 0–0.61)
EOSINOPHIL NFR BLD AUTO: 0 % (ref 0–6)
ERYTHROCYTE [DISTWIDTH] IN BLOOD BY AUTOMATED COUNT: 14.4 % (ref 11.6–15.1)
ERYTHROCYTE [DISTWIDTH] IN BLOOD BY AUTOMATED COUNT: 14.5 % (ref 11.6–15.1)
GFR SERPL CREATININE-BSD FRML MDRD: 89 ML/MIN/1.73SQ M
GLUCOSE SERPL-MCNC: 213 MG/DL (ref 65–140)
GLUCOSE SERPL-MCNC: 223 MG/DL (ref 65–140)
GLUCOSE SERPL-MCNC: 226 MG/DL (ref 65–140)
GLUCOSE SERPL-MCNC: 240 MG/DL (ref 65–140)
GLUCOSE SERPL-MCNC: 256 MG/DL (ref 65–140)
HCT VFR BLD AUTO: 35.2 % (ref 34.8–46.1)
HCT VFR BLD AUTO: 37.8 % (ref 34.8–46.1)
HGB BLD-MCNC: 10.9 G/DL (ref 11.5–15.4)
HGB BLD-MCNC: 12 G/DL (ref 11.5–15.4)
IMM GRANULOCYTES # BLD AUTO: 0.07 THOUSAND/UL (ref 0–0.2)
IMM GRANULOCYTES NFR BLD AUTO: 1 % (ref 0–2)
LYMPHOCYTES # BLD AUTO: 0.76 THOUSANDS/ΜL (ref 0.6–4.47)
LYMPHOCYTES NFR BLD AUTO: 7 % (ref 14–44)
MAGNESIUM SERPL-MCNC: 2.2 MG/DL (ref 1.6–2.6)
MCH RBC QN AUTO: 27.7 PG (ref 26.8–34.3)
MCH RBC QN AUTO: 28.4 PG (ref 26.8–34.3)
MCHC RBC AUTO-ENTMCNC: 31 G/DL (ref 31.4–37.4)
MCHC RBC AUTO-ENTMCNC: 31.7 G/DL (ref 31.4–37.4)
MCV RBC AUTO: 89 FL (ref 82–98)
MCV RBC AUTO: 90 FL (ref 82–98)
MONOCYTES # BLD AUTO: 0.67 THOUSAND/ΜL (ref 0.17–1.22)
MONOCYTES NFR BLD AUTO: 6 % (ref 4–12)
NEUTROPHILS # BLD AUTO: 10.1 THOUSANDS/ΜL (ref 1.85–7.62)
NEUTS SEG NFR BLD AUTO: 86 % (ref 43–75)
NRBC BLD AUTO-RTO: 0 /100 WBCS
PLATELET # BLD AUTO: 267 THOUSANDS/UL (ref 149–390)
PLATELET # BLD AUTO: 288 THOUSANDS/UL (ref 149–390)
PMV BLD AUTO: 10 FL (ref 8.9–12.7)
PMV BLD AUTO: 10.3 FL (ref 8.9–12.7)
POTASSIUM SERPL-SCNC: 4.1 MMOL/L (ref 3.5–5.3)
PROT SERPL-MCNC: 7.1 G/DL (ref 6.4–8.2)
RBC # BLD AUTO: 3.93 MILLION/UL (ref 3.81–5.12)
RBC # BLD AUTO: 4.23 MILLION/UL (ref 3.81–5.12)
SODIUM SERPL-SCNC: 132 MMOL/L (ref 136–145)
WBC # BLD AUTO: 10.31 THOUSAND/UL (ref 4.31–10.16)
WBC # BLD AUTO: 11.62 THOUSAND/UL (ref 4.31–10.16)

## 2018-09-08 PROCEDURE — 82948 REAGENT STRIP/BLOOD GLUCOSE: CPT

## 2018-09-08 PROCEDURE — 99024 POSTOP FOLLOW-UP VISIT: CPT | Performed by: THORACIC SURGERY (CARDIOTHORACIC VASCULAR SURGERY)

## 2018-09-08 PROCEDURE — 80053 COMPREHEN METABOLIC PANEL: CPT | Performed by: THORACIC SURGERY (CARDIOTHORACIC VASCULAR SURGERY)

## 2018-09-08 PROCEDURE — 85025 COMPLETE CBC W/AUTO DIFF WBC: CPT | Performed by: SURGERY

## 2018-09-08 PROCEDURE — 85027 COMPLETE CBC AUTOMATED: CPT | Performed by: THORACIC SURGERY (CARDIOTHORACIC VASCULAR SURGERY)

## 2018-09-08 PROCEDURE — 83735 ASSAY OF MAGNESIUM: CPT | Performed by: THORACIC SURGERY (CARDIOTHORACIC VASCULAR SURGERY)

## 2018-09-08 RX ORDER — ONDANSETRON 2 MG/ML
4 INJECTION INTRAMUSCULAR; INTRAVENOUS ONCE
Status: COMPLETED | OUTPATIENT
Start: 2018-09-08 | End: 2018-09-08

## 2018-09-08 RX ORDER — PROMETHAZINE HYDROCHLORIDE 25 MG/ML
25 INJECTION, SOLUTION INTRAMUSCULAR; INTRAVENOUS EVERY 6 HOURS PRN
Status: DISCONTINUED | OUTPATIENT
Start: 2018-09-08 | End: 2018-09-08

## 2018-09-08 RX ORDER — METOCLOPRAMIDE HYDROCHLORIDE 5 MG/ML
10 INJECTION INTRAMUSCULAR; INTRAVENOUS EVERY 6 HOURS PRN
Status: DISCONTINUED | OUTPATIENT
Start: 2018-09-08 | End: 2018-09-10 | Stop reason: HOSPADM

## 2018-09-08 RX ORDER — DIPHENHYDRAMINE HCL 25 MG
25 TABLET ORAL ONCE
Status: COMPLETED | OUTPATIENT
Start: 2018-09-08 | End: 2018-09-08

## 2018-09-08 RX ORDER — LABETALOL HYDROCHLORIDE 5 MG/ML
10 INJECTION, SOLUTION INTRAVENOUS EVERY 6 HOURS PRN
Status: DISCONTINUED | OUTPATIENT
Start: 2018-09-08 | End: 2018-09-10 | Stop reason: HOSPADM

## 2018-09-08 RX ADMIN — INSULIN LISPRO 4 UNITS: 100 INJECTION, SOLUTION INTRAVENOUS; SUBCUTANEOUS at 16:22

## 2018-09-08 RX ADMIN — ACETAMINOPHEN 650 MG: 325 TABLET, FILM COATED ORAL at 00:24

## 2018-09-08 RX ADMIN — DIPHENHYDRAMINE HCL 25 MG: 25 TABLET ORAL at 16:21

## 2018-09-08 RX ADMIN — HEPARIN SODIUM 5000 UNITS: 5000 INJECTION, SOLUTION INTRAVENOUS; SUBCUTANEOUS at 05:42

## 2018-09-08 RX ADMIN — HEPARIN SODIUM 5000 UNITS: 5000 INJECTION, SOLUTION INTRAVENOUS; SUBCUTANEOUS at 14:59

## 2018-09-08 RX ADMIN — INSULIN GLARGINE 30 UNITS: 100 INJECTION, SOLUTION SUBCUTANEOUS at 21:29

## 2018-09-08 RX ADMIN — PROMETHAZINE HYDROCHLORIDE 25 MG: 25 INJECTION INTRAMUSCULAR; INTRAVENOUS at 12:44

## 2018-09-08 RX ADMIN — HYDROMORPHONE HYDROCHLORIDE 0.5 MG: 1 INJECTION, SOLUTION INTRAMUSCULAR; INTRAVENOUS; SUBCUTANEOUS at 08:32

## 2018-09-08 RX ADMIN — LABETALOL 20 MG/4 ML (5 MG/ML) INTRAVENOUS SYRINGE 10 MG: at 02:41

## 2018-09-08 RX ADMIN — ONDANSETRON 4 MG: 2 INJECTION, SOLUTION INTRAMUSCULAR; INTRAVENOUS at 08:32

## 2018-09-08 RX ADMIN — INSULIN LISPRO 4 UNITS: 100 INJECTION, SOLUTION INTRAVENOUS; SUBCUTANEOUS at 12:44

## 2018-09-08 RX ADMIN — HYDROMORPHONE HYDROCHLORIDE 0.5 MG: 1 INJECTION, SOLUTION INTRAMUSCULAR; INTRAVENOUS; SUBCUTANEOUS at 02:42

## 2018-09-08 RX ADMIN — ACETAMINOPHEN 650 MG: 325 TABLET, FILM COATED ORAL at 05:42

## 2018-09-08 RX ADMIN — INSULIN LISPRO 4 UNITS: 100 INJECTION, SOLUTION INTRAVENOUS; SUBCUTANEOUS at 06:00

## 2018-09-08 RX ADMIN — OXYCODONE HYDROCHLORIDE 10 MG: 10 TABLET ORAL at 19:52

## 2018-09-08 RX ADMIN — LEVOTHYROXINE SODIUM 100 MCG: 100 TABLET ORAL at 05:42

## 2018-09-08 RX ADMIN — ASPIRIN 81 MG 81 MG: 81 TABLET ORAL at 08:32

## 2018-09-08 RX ADMIN — ACETAMINOPHEN 650 MG: 325 TABLET, FILM COATED ORAL at 12:44

## 2018-09-08 RX ADMIN — LOSARTAN POTASSIUM 50 MG: 50 TABLET ORAL at 08:32

## 2018-09-08 RX ADMIN — OXYCODONE HYDROCHLORIDE 10 MG: 10 TABLET ORAL at 05:45

## 2018-09-08 RX ADMIN — HEPARIN SODIUM 5000 UNITS: 5000 INJECTION, SOLUTION INTRAVENOUS; SUBCUTANEOUS at 21:29

## 2018-09-08 NOTE — SOCIAL WORK
80yo female is Indonesian-speaking only, family members present in room  She is POD#1 thymectomy  Has chest tube and drain  She is oob in chair, alert and oriented, independent ADLs, does not drive  She resides in [place] with her son and daughter-in-law  Daughter-in-law is : Nicole Stubbs at [phone]  They live in ranch home with 2 NAVEED  She has no hx HHC, DME, or rehab  No POA  Denies mental illness or D&A  PCP is Dr Alberto Beltre  At this time no apparent HHC needs, CM continues to follow  Family will transport home at discharge  Pt  Uses [facility]  CM reviewed d/c planning process including the following: identifying help at home, patient preference for d/c planning needs, Discharge Lounge, [facility] Meds to Bed program, availability of treatment team to discuss questions or concerns patient and/or family may have regarding understanding medications and recognizing signs and symptoms once discharged  CM also encouraged patient to follow up with all recommended appointments after discharge  Patient advised of importance for patient and family to participate in managing patients medical well being  Patient/caregiver received discharge checklist  Content reviewed  Patient/caregiver encouraged to participate in discharge plan of care prior to discharge home

## 2018-09-08 NOTE — PROGRESS NOTES
Progress Note - Thoracic Surgery   Antonieta Cali 71 y o  female MRN: [id number]  Unit/Bed#: [facility] 410-01 Encounter: [id number]    Assessment:  71 y o  F w/ mediastinal mass s/p robotic assisted total thymectomy on 9/7/18    Doing well post op  Pain controlled  Hypertensive overnight   Drain output 85, SS  CXR wnl    Plan:  Diet as tolerated  Saline lock  Discontinue drain today  Dispo planning  PT/OT  D/c Yadira    Subjective/Objective     Chief Complaint:    Subjective:       Objective:     Vitals: Blood pressure 128/62, pulse 69, temperature 97 8 °F (36 6 °C), temperature source Oral, resp  rate 16, height 5' 4" (1 626 m), weight 81 2 kg (179 lb), SpO2 97 %  ,Body mass index is 30 73 kg/m²  I/O       09/06 0701 - 09/07 0700 09/07 0701 - 09/08 0700    P  O   480    I V  (mL/kg)  2740 (33 7)    NG/GT  60    Total Intake(mL/kg)  3280 (40 4)    Urine (mL/kg/hr)  2500 (1 3)    Drains  85    Blood  25    Total Output   2610    Net   +670                Physical Exam:   NAD  CV RRR  Pulm CTA  Chest:  incisions c/d/i, drain S/S   Abd soft, NTND    Lab, Imaging and other studies: CBC with diff: No results found for: WBC, HGB, HCT, MCV, PLT, ADJUSTEDWBC, MCH, MCHC, RDW, MPV, NRBC, BMP/CMP: No results found for: NA, K, CL, CO2, ANIONGAP, BUN, CREATININE, GLUCOSE, CALCIUM, AST, ALT, ALKPHOS, PROT, ALBUMIN, BILITOT, EGFR, Magnesium: No results found for: MAG  VTE Pharmacologic Prophylaxis: Heparin  VTE Mechanical Prophylaxis: foot pump applied

## 2018-09-09 LAB
GLUCOSE SERPL-MCNC: 141 MG/DL (ref 65–140)
GLUCOSE SERPL-MCNC: 178 MG/DL (ref 65–140)
GLUCOSE SERPL-MCNC: 202 MG/DL (ref 65–140)
GLUCOSE SERPL-MCNC: 297 MG/DL (ref 65–140)

## 2018-09-09 PROCEDURE — 82948 REAGENT STRIP/BLOOD GLUCOSE: CPT

## 2018-09-09 PROCEDURE — 99024 POSTOP FOLLOW-UP VISIT: CPT | Performed by: THORACIC SURGERY (CARDIOTHORACIC VASCULAR SURGERY)

## 2018-09-09 RX ADMIN — ACETAMINOPHEN 650 MG: 325 TABLET, FILM COATED ORAL at 17:12

## 2018-09-09 RX ADMIN — OXYCODONE HYDROCHLORIDE 5 MG: 5 TABLET ORAL at 00:34

## 2018-09-09 RX ADMIN — OXYCODONE HYDROCHLORIDE 10 MG: 10 TABLET ORAL at 06:00

## 2018-09-09 RX ADMIN — ACETAMINOPHEN 650 MG: 325 TABLET, FILM COATED ORAL at 06:00

## 2018-09-09 RX ADMIN — ACETAMINOPHEN 650 MG: 325 TABLET, FILM COATED ORAL at 11:20

## 2018-09-09 RX ADMIN — INSULIN GLARGINE 30 UNITS: 100 INJECTION, SOLUTION SUBCUTANEOUS at 22:32

## 2018-09-09 RX ADMIN — HEPARIN SODIUM 5000 UNITS: 5000 INJECTION, SOLUTION INTRAVENOUS; SUBCUTANEOUS at 13:41

## 2018-09-09 RX ADMIN — OXYCODONE HYDROCHLORIDE 10 MG: 10 TABLET ORAL at 15:51

## 2018-09-09 RX ADMIN — LOSARTAN POTASSIUM 50 MG: 50 TABLET ORAL at 08:34

## 2018-09-09 RX ADMIN — INSULIN LISPRO 6 UNITS: 100 INJECTION, SOLUTION INTRAVENOUS; SUBCUTANEOUS at 11:20

## 2018-09-09 RX ADMIN — HEPARIN SODIUM 5000 UNITS: 5000 INJECTION, SOLUTION INTRAVENOUS; SUBCUTANEOUS at 22:31

## 2018-09-09 RX ADMIN — ASPIRIN 81 MG 81 MG: 81 TABLET ORAL at 08:34

## 2018-09-09 RX ADMIN — INSULIN LISPRO 2 UNITS: 100 INJECTION, SOLUTION INTRAVENOUS; SUBCUTANEOUS at 17:12

## 2018-09-09 RX ADMIN — LEVOTHYROXINE SODIUM 100 MCG: 100 TABLET ORAL at 05:59

## 2018-09-09 RX ADMIN — HEPARIN SODIUM 5000 UNITS: 5000 INJECTION, SOLUTION INTRAVENOUS; SUBCUTANEOUS at 06:00

## 2018-09-09 RX ADMIN — ACETAMINOPHEN 650 MG: 325 TABLET, FILM COATED ORAL at 00:34

## 2018-09-09 NOTE — PROGRESS NOTES
09/09/18    Procedure: Drain removal    Drain removed in routine fashion without incident  The patient tolerated the procedure well  A dry, sterile dressing was placed      Stephen Blood MD

## 2018-09-09 NOTE — CASE MANAGEMENT
Initial Clinical Review    Age/Sex: 71 y o  female    Surgery Date: 09/07/2018    Procedure: ROBOTIC ASSISTED TOTAL THYMECTOMY (Left)  TRANSVERSE CERVICAL INCISION TO EXTRACT SPECIMENT (N/A)    Anesthesia: General    Operative Indications:  Mediastinal mass [J98 59]  70 yo F with 3 5 x 2 4 x 2 5 cm anterior mediastinal mass found on workup for right shoulder pain  Concern for thymoma versus mediastinal thyroid goiter       Operative Findings:  Well circumscribed 3cm anterior mediastinal mass attached the the right superior pole of the thymus   Mass well above the innominate vein, inaccessible from thoracic approach       Complications:   None      Admission Orders: Date/Time/Statement: 9/7/18 @ 1150     Orders Placed This Encounter   Procedures    Inpatient Admission     Standing Status:   Standing     Number of Occurrences:   1     Order Specific Question:   Admitting Physician     Answer:   Daniella Smith     Order Specific Question:   Level of Care     Answer:   Level 1 Stepdown [13]     Order Specific Question:   Bed request comments     Answer:   pph 4     Order Specific Question:   Estimated length of stay     Answer:   Inpatient Only Surgery       Vital Signs: /67 (BP Location: Left arm) Comment: Map 87  Pulse 79   Temp 98 °F (36 7 °C) (Oral)   Resp 18   Ht 5' 4" (1 626 m)   Wt 84 kg (185 lb 3 oz)   SpO2 92%   BMI 31 79 kg/m²     Diet: Regular; Regular House; Consistent Carbohydrate Diet Level 2 (5 carb servings/75 grams CHO/meal)     Mobility: ambulate pt 3x/day POD #1, increase activity as tolerated    DVT Prophylaxis: B/L venodynes    Scheduled Meds:  Current Facility-Administered Medications:  acetaminophen 650 mg Oral Q6H [address] Aura Al MD   aspirin 81 mg Oral Daily Timmothy MD Phong   heparin (porcine) 5,000 Units Subcutaneous Q8H [address] Timmothy MD Phong   HYDROmorphone 0 5 mg Intravenous Q4H PRN Brandon Darling MD   insulin glargine 30 Units Subcutaneous HS Lonny Subramanian MD   insulin lispro 2-12 Units Subcutaneous TID [facility] Lonny Subramanian MD   labetalol 10 mg Intravenous Q6H PRN Justin Gillette MD   levothyroxine 100 mcg Oral Early Morning Lonny Subramanian MD   losartan 50 mg Oral Daily Lonny Subramanian MD   metoclopramide 10 mg Intravenous Q6H PRN Zach Harden MD   oxyCODONE 10 mg Oral Q4H PRN Lonny Subramanian MD   oxyCODONE 5 mg Oral Q4H PRN Lonny Subramanian MD     Continuous Infusions:   PRN Meds:  HYDROmorphone    labetalol    metoclopramide    oxyCODONE

## 2018-09-09 NOTE — PROGRESS NOTES
Progress Note - Thoracic Surgery   Byron Jalloh 71 y o  female MRN: [id number]  Unit/Bed#: [facility] 410-01 Encounter: [id number]    Assessment:  72F w/ mediastinal mass s/p robotic assisted total thymectomy on 9/7/18    - Patient had nausea and vomiting yesterday therefore stayed one more day for stabilization    Plan:  - Diet as tolerated  - Discontinue Drain  - PT/OT rec's  - Discharge planning - today, possibly tomorrow 9/10    Subjective/Objective   Chief Complaint:     Subjective: No acute events overnight  Patient did not vomit or have nausea overnight  She is however uncomfortable and feels discomfort  She states she does not want to go home today as she lives far away and is feeling bad still  No fevers, chills, SOB or chest pain  Objective:     Blood pressure 157/73, pulse 80, temperature 98 2 °F (36 8 °C), temperature source Oral, resp  rate 18, height 5' 4" (1 626 m), weight 83 9 kg (184 lb 15 5 oz), SpO2 93 %  ,Body mass index is 31 75 kg/m²  Intake/Output Summary (Last 24 hours) at 09/09/18 0544  Last data filed at 09/09/18 0049   Gross per 24 hour   Intake              720 ml   Output             2400 ml   Net            -1680 ml       Invasive Devices     Peripheral Intravenous Line            Peripheral IV 09/07/18 Left Arm 1 day    Peripheral IV 09/07/18 Left Hand 1 day          Drain            Closed/Suction Drain Left Chest Bulb 24 Fr  1 day                Physical Exam: General: AAOx3  Respiratory: Breath sounds b/l, wheezes heard bilaterally  Chest - Incision c/d/i, Left chest bulb (24F) - 170 SS (more serous today vs yesterday, fluid is thinning)  Tender to palpation at chest tube site  Abdomen: Soft, NT, no rebound/guarding  Heart: RRR, S1s2  Ext: Warm no cyanosis     Lab, Imaging and other studies:  I have personally reviewed pertinent lab results    , CBC:   Lab Results   Component Value Date    WBC 11 62 (H) 09/08/2018    HGB 12 0 09/08/2018    HCT 37 8 09/08/2018    MCV 89 09/08/2018     09/08/2018    MCH 28 4 09/08/2018    MCHC 31 7 09/08/2018    RDW 14 5 09/08/2018    MPV 10 0 09/08/2018    NRBC 0 09/08/2018   , CMP:   Lab Results   Component Value Date     (L) 09/08/2018    K 4 1 09/08/2018     09/08/2018    CO2 26 09/08/2018    BUN 11 09/08/2018    CREATININE 0 67 09/08/2018    CALCIUM 7 9 (L) 09/08/2018    AST 20 09/08/2018    ALT 27 09/08/2018    ALKPHOS 60 09/08/2018    EGFR 89 09/08/2018     VTE Pharmacologic Prophylaxis: Heparin  VTE Mechanical Prophylaxis: sequential compression device

## 2018-09-10 VITALS
RESPIRATION RATE: 18 BRPM | OXYGEN SATURATION: 92 % | HEIGHT: 64 IN | BODY MASS INDEX: 30.15 KG/M2 | TEMPERATURE: 98.1 F | SYSTOLIC BLOOD PRESSURE: 125 MMHG | HEART RATE: 76 BPM | WEIGHT: 176.59 LBS | DIASTOLIC BLOOD PRESSURE: 60 MMHG

## 2018-09-10 LAB
GLUCOSE SERPL-MCNC: 174 MG/DL (ref 65–140)
GLUCOSE SERPL-MCNC: 214 MG/DL (ref 65–140)

## 2018-09-10 PROCEDURE — 82948 REAGENT STRIP/BLOOD GLUCOSE: CPT

## 2018-09-10 PROCEDURE — 99024 POSTOP FOLLOW-UP VISIT: CPT | Performed by: THORACIC SURGERY (CARDIOTHORACIC VASCULAR SURGERY)

## 2018-09-10 RX ORDER — DOCUSATE SODIUM 100 MG/1
100 CAPSULE, LIQUID FILLED ORAL 2 TIMES DAILY
Qty: 10 CAPSULE | Refills: 0 | Status: SHIPPED | OUTPATIENT
Start: 2018-09-10 | End: 2019-07-03

## 2018-09-10 RX ORDER — SENNOSIDES 8.6 MG
1 TABLET ORAL
Status: DISCONTINUED | OUTPATIENT
Start: 2018-09-10 | End: 2018-09-10 | Stop reason: HOSPADM

## 2018-09-10 RX ORDER — SENNOSIDES 8.6 MG
1 TABLET ORAL
Qty: 120 EACH | Refills: 0 | Status: SHIPPED | OUTPATIENT
Start: 2018-09-10 | End: 2019-07-03

## 2018-09-10 RX ORDER — OXYCODONE HYDROCHLORIDE 5 MG/1
5 TABLET ORAL EVERY 4 HOURS PRN
Qty: 50 TABLET | Refills: 0 | Status: SHIPPED | OUTPATIENT
Start: 2018-09-10 | End: 2018-09-20

## 2018-09-10 RX ORDER — POLYETHYLENE GLYCOL 3350 17 G/17G
17 POWDER, FOR SOLUTION ORAL ONCE
Status: COMPLETED | OUTPATIENT
Start: 2018-09-10 | End: 2018-09-10

## 2018-09-10 RX ORDER — DOCUSATE SODIUM 100 MG/1
100 CAPSULE, LIQUID FILLED ORAL 2 TIMES DAILY
Status: DISCONTINUED | OUTPATIENT
Start: 2018-09-10 | End: 2018-09-10 | Stop reason: HOSPADM

## 2018-09-10 RX ADMIN — HEPARIN SODIUM 5000 UNITS: 5000 INJECTION, SOLUTION INTRAVENOUS; SUBCUTANEOUS at 05:38

## 2018-09-10 RX ADMIN — DOCUSATE SODIUM 100 MG: 100 CAPSULE, LIQUID FILLED ORAL at 10:18

## 2018-09-10 RX ADMIN — ASPIRIN 81 MG 81 MG: 81 TABLET ORAL at 08:33

## 2018-09-10 RX ADMIN — INSULIN LISPRO 4 UNITS: 100 INJECTION, SOLUTION INTRAVENOUS; SUBCUTANEOUS at 11:24

## 2018-09-10 RX ADMIN — ACETAMINOPHEN 650 MG: 325 TABLET, FILM COATED ORAL at 11:24

## 2018-09-10 RX ADMIN — ACETAMINOPHEN 650 MG: 325 TABLET, FILM COATED ORAL at 00:11

## 2018-09-10 RX ADMIN — LOSARTAN POTASSIUM 50 MG: 50 TABLET ORAL at 08:33

## 2018-09-10 RX ADMIN — POLYETHYLENE GLYCOL 3350 17 G: 17 POWDER, FOR SOLUTION ORAL at 10:18

## 2018-09-10 RX ADMIN — ACETAMINOPHEN 650 MG: 325 TABLET, FILM COATED ORAL at 05:38

## 2018-09-10 RX ADMIN — LEVOTHYROXINE SODIUM 100 MCG: 100 TABLET ORAL at 05:38

## 2018-09-10 RX ADMIN — INSULIN LISPRO 2 UNITS: 100 INJECTION, SOLUTION INTRAVENOUS; SUBCUTANEOUS at 06:26

## 2018-09-10 NOTE — RESTORATIVE TECHNICIAN NOTE
Restorative Specialist Mobility Note       Activity: Ambulate in cai, Chair     Assistive Device: Front wheel walker

## 2018-09-10 NOTE — DISCHARGE INSTRUCTIONS
Gently wash your incisions daily with soap and water, do not soak in a tub  Do not apply any lotions, creams, or ointments to incisions  No lifting over 10 lbs or strenuous exercise  No driving until seen at your post operative visit  The stitch will be removed at your post operative visit  When you are discharged from the hospital, you will be given a prescription for a narcotic pain medicine every 4-6 hours as needed for pain and start to decrease the frequency as soon as possible  We would also like you to take an anti-inflammatory medicine with your narcotic  We recommend Ibuprofen (Advil, Motrin) 800 mg 3 times a day with food  Continue Ibuprofen until your first post -operative visit  Your pain medicine needs will be reassessed at that visit  If you are unable to take anti-inflammatory medicines secondary to a pre-existing medical condition, please take Tylenol 650 mg 3 times a day as needed for pain

## 2018-09-10 NOTE — DISCHARGE SUMMARY
Discharge Summary - Thoracic Surgery   [address] [address] 71 y o  female MRN: [id number]  Unit/Bed#: [facility] 410-01 Encounter: [id number]    Admission Date:   Admission Orders     Ordered        09/07/18 1150  Inpatient Admission  Once                Discharge Date: 9/10/18    Admitting Diagnosis: Mediastinal mass [J98 59]    Discharge Diagnosis: same     Resolved Problems  Date Reviewed: 9/10/2018    None          Attending: Dr Rivas Carbajal Physician(s): none     Procedures Performed: Robotic assisted left total thymectomy with transverse cervical incision performed on 9/7/18    Pathology: final pathology is pending    Hospital Course: Ms Max Flores was electively admitted on 9/7/18 and underwent a robotic assisted left thymectomy with cervical incision for removal  She tolerated the procedure well and was transferred to med/surg post operatively  She had some post op nausea and moderate drain output  The drain was removed on POD #2 and she was stable for discharge on POD #3  She will follow up with Dr Ariel Cardoso in two weeks  Her discharge instructions were communicated through her daughter in law  Condition at Discharge: good     Discharge instructions/Information to patient and family:   See after visit summary for information provided to patient and family  Provisions for Follow-Up Care:  See after visit summary for information related to follow-up care and any pertinent home health orders  Disposition: Home        Planned Readmission: No    Discharge Statement   I spent 20 minutes discharging the patient  This time was spent on the day of discharge  I had direct contact with the patient on the day of discharge  Additional documentation is required if more than 30 minutes were spent on discharge  Discharge Medications:  See after visit summary for reconciled discharge medications provided to patient and family

## 2018-09-12 ENCOUNTER — TELEPHONE (OUTPATIENT)
Dept: OTHER | Facility: HOSPITAL | Age: 73
End: 2018-09-12

## 2018-09-12 NOTE — TELEPHONE ENCOUNTER
Spoke with the patient's daughter-in-law Little Zeinab this afternoon regarding Rose's pathology  The final pathology report came back today and was consistent with benign thymus tissue and a 3 6 cm thyroid adenoma  This extra-anatomic thyroid tissue was at the superior aspect of the specimen in was the mass of concern seen on imaging  Given carmine to previous history of thyroidectomy on levothyroxine, this mediastinal thyroid tissue may have been supplying some extra thyroid hormone  With this now out she may require increased doses of levothyroxine  I instructed them to follow up with Rose's primary care physician within 1 month to review her thyroid medication now that this ectopic thyroid tissue was out  Otherwise Na Conner is recovering very well from surgery  She has mild increased mucus production and I instructed them to use over-the-counter Mucinex to help break this up and get this out  Her pain is well controlled  No fevers or chills

## 2018-09-19 ENCOUNTER — OFFICE VISIT (OUTPATIENT)
Dept: CARDIAC SURGERY | Facility: CLINIC | Age: 73
End: 2018-09-19

## 2018-09-19 VITALS
HEART RATE: 81 BPM | DIASTOLIC BLOOD PRESSURE: 67 MMHG | BODY MASS INDEX: 30.25 KG/M2 | HEIGHT: 64 IN | SYSTOLIC BLOOD PRESSURE: 149 MMHG | WEIGHT: 177.2 LBS | TEMPERATURE: 98.5 F | OXYGEN SATURATION: 97 %

## 2018-09-19 DIAGNOSIS — J98.59 MEDIASTINAL MASS: Primary | ICD-10-CM

## 2018-09-19 PROCEDURE — 99024 POSTOP FOLLOW-UP VISIT: CPT | Performed by: THORACIC SURGERY (CARDIOTHORACIC VASCULAR SURGERY)

## 2018-09-19 NOTE — PROGRESS NOTES
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  She had a previous history of transcervical thyroidectomy but this is likely ectopic tissue  She is recovering well from a surgical standpoint    I encouraged Lysle Hodgkin to take pain medication more consistently as she is still only 12 days postoperative  She will try a regimen of standing Tylenol with oxycodone only as needed  She is able to phonate without issue in her voice is adequate  She still has a sore throat likely from the transverse cervical incision endotracheal tube  This should get better with time  I have also advised her to see her primary care physician  She has an appointment with him next week  Given that we removed some ectopic thyroid tissue she may need to increase her dose of thyroid replacement hormone  It is difficult to say whether not her fatigue currently is normal postoperative recovery versus being clinically hypothyroid  I would like to see Lysle Hodgkin back in 1 months time to further evaluate her recovery and make sure she is doing well  Follow-up appointment made    There are no diagnoses linked to this encounter  Thoracic History          Subjective:    Patient ID: Peg Givens is a 71 y o  female  HPI  Lysle Hodgkin underwent a robotic assisted total thymectomy with transverse cervical incision for mass removal on 09/07/2018  She was discharged to home on 09/10  I called to discuss her pathology with her family on 09/12 she comes in today for a follow-up appointment  Lysle Hodgkin has been doing fairly well at home since surgery  She is ambulating frequently and went on a hike this past weekend  She is cooking in caring for family actively    But she also has moderate left sided rib and chest discomfort as well as a sore throat  She has not been taking any pain medications for the past few days  She is not taking Tylenol nor oxycodone  She denies any fevers or chills  She has a good appetite but is still somewhat fatigued  The following portions of the patient's history were reviewed and updated as appropriate: allergies, current medications, past family history, past medical history, past social history, past surgical history and problem list     Review of Systems   Constitutional: Positive for activity change and fatigue  Negative for appetite change, chills, diaphoresis, fever and unexpected weight change  HENT: Positive for sore throat, trouble swallowing and voice change  Eyes: Negative  Respiratory: Negative for apnea, cough, chest tightness, shortness of breath, wheezing and stridor  Cardiovascular: Negative for chest pain, palpitations and leg swelling  Gastrointestinal: Negative for abdominal distention, abdominal pain, blood in stool, constipation, diarrhea, nausea and vomiting  Endocrine: Negative  Genitourinary: Negative  Musculoskeletal: Positive for myalgias  Skin: Negative  Allergic/Immunologic: Negative  Neurological: Negative  Hematological: Negative  Psychiatric/Behavioral: Negative  Objective:   Physical Exam   Constitutional: She is oriented to person, place, and time  She appears well-developed and well-nourished  No distress  HENT:   Head: Normocephalic and atraumatic  Mouth/Throat: Oropharynx is clear and moist  No oropharyngeal exudate  Eyes: Conjunctivae and EOM are normal  Pupils are equal, round, and reactive to light  No scleral icterus  Neck: Normal range of motion  Neck supple  No JVD present  No tracheal deviation present  No thyromegaly present  Transverse cervical incision healing well  No erythema  No drainage  Mild swelling but stable  No stridor  Voice adequate at this time    Just somewhat quiet Cardiovascular: Normal rate, regular rhythm, normal heart sounds and intact distal pulses  Exam reveals no gallop and no friction rub  No murmur heard  Pulmonary/Chest: Effort normal and breath sounds normal  No respiratory distress  She has no wheezes  She has no rales  She exhibits no tenderness  Left chest wall incisions x4  No erythema  No drainage  No sutures to remove  Incisions are clean dry and intact   Abdominal: Soft  Bowel sounds are normal  She exhibits no distension and no mass  There is no tenderness  There is no rebound and no guarding  Musculoskeletal: Normal range of motion  She exhibits no edema, tenderness or deformity  Neurological: She is alert and oriented to person, place, and time  Skin: Skin is warm and dry  No rash noted  She is not diaphoretic  No erythema  No pallor  Psychiatric: She has a normal mood and affect  Her behavior is normal  Judgment and thought content normal    Nursing note and vitals reviewed  /67 (BP Location: Left arm, Patient Position: Sitting, Cuff Size: Adult)   Pulse 81   Temp 98 5 °F (36 9 °C)   Ht 5' 4" (1 626 m)   Wt 80 4 kg (177 lb 3 2 oz)   SpO2 97%   BMI 30 42 kg/m²       Pathology - 3 6 cm benign thyroid adenoma connected to normal benign thymus tissue       Stephie Escalante MD  Thoracic Surgeon

## 2018-09-19 NOTE — LETTER
September 19, 2018     [place], [address]    Patient: Zbigniew Gallego   YOB: 1945   Date of Visit: 9/19/2018       Dear Dr London Dent: Thank you for referring Debraeloina Rubin to me for evaluation  Below are my notes for this consultation  If you have questions, please do not hesitate to call me  I look forward to following your patient along with you  Sincerely,        Yaya Nash MD        CC: No Recipients  Yaya Nash MD  9/19/2018  5:17 PM  Sign at close encounter  Thoracic Follow-Up  Assessment/Plan:   64-year-old female status post 09/07/2018 robotic assisted total thymectomy with transverse cervical incision for a mediastinal mass  Final pathology proved this to be a 3 6 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  She had a previous history of transcervical thyroidectomy but this is likely ectopic tissue  She is recovering well from a surgical standpoint    I encouraged Hallie Johns to take pain medication more consistently as she is still only 12 days postoperative  She will try a regimen of standing Tylenol with oxycodone only as needed  She is able to phonate without issue in her voice is adequate  She still has a sore throat likely from the transverse cervical incision endotracheal tube  This should get better with time  I have also advised her to see her primary care physician  She has an appointment with him next week  Given that we removed some ectopic thyroid tissue she may need to increase her dose of thyroid replacement hormone  It is difficult to say whether not her fatigue currently is normal postoperative recovery versus being clinically hypothyroid  I would like to see Hallie Johns back in 1 months time to further evaluate her recovery and make sure she is doing well  Follow-up appointment made    There are no diagnoses linked to this encounter        Thoracic History Subjective:    Patient ID: Huong Yin is a 71 y o  female  HPI  Malika Hadley underwent a robotic assisted total thymectomy with transverse cervical incision for mass removal on 09/07/2018  She was discharged to home on 09/10  I called to discuss her pathology with her family on 09/12 she comes in today for a follow-up appointment  Malika Hadley has been doing fairly well at home since surgery  She is ambulating frequently and went on a hike this past weekend  She is cooking in caring for family actively  But she also has moderate left sided rib and chest discomfort as well as a sore throat  She has not been taking any pain medications for the past few days  She is not taking Tylenol nor oxycodone  She denies any fevers or chills  She has a good appetite but is still somewhat fatigued  The following portions of the patient's history were reviewed and updated as appropriate: allergies, current medications, past family history, past medical history, past social history, past surgical history and problem list     Review of Systems   Constitutional: Positive for activity change and fatigue  Negative for appetite change, chills, diaphoresis, fever and unexpected weight change  HENT: Positive for sore throat, trouble swallowing and voice change  Eyes: Negative  Respiratory: Negative for apnea, cough, chest tightness, shortness of breath, wheezing and stridor  Cardiovascular: Negative for chest pain, palpitations and leg swelling  Gastrointestinal: Negative for abdominal distention, abdominal pain, blood in stool, constipation, diarrhea, nausea and vomiting  Endocrine: Negative  Genitourinary: Negative  Musculoskeletal: Positive for myalgias  Skin: Negative  Allergic/Immunologic: Negative  Neurological: Negative  Hematological: Negative  Psychiatric/Behavioral: Negative            Objective:   Physical Exam   Constitutional: She is oriented to person, place, and time  She appears well-developed and well-nourished  No distress  HENT:   Head: Normocephalic and atraumatic  Mouth/Throat: Oropharynx is clear and moist  No oropharyngeal exudate  Eyes: Conjunctivae and EOM are normal  Pupils are equal, round, and reactive to light  No scleral icterus  Neck: Normal range of motion  Neck supple  No JVD present  No tracheal deviation present  No thyromegaly present  Transverse cervical incision healing well  No erythema  No drainage  Mild swelling but stable  No stridor  Voice adequate at this time  Just somewhat quiet   Cardiovascular: Normal rate, regular rhythm, normal heart sounds and intact distal pulses  Exam reveals no gallop and no friction rub  No murmur heard  Pulmonary/Chest: Effort normal and breath sounds normal  No respiratory distress  She has no wheezes  She has no rales  She exhibits no tenderness  Left chest wall incisions x4  No erythema  No drainage  No sutures to remove  Incisions are clean dry and intact   Abdominal: Soft  Bowel sounds are normal  She exhibits no distension and no mass  There is no tenderness  There is no rebound and no guarding  Musculoskeletal: Normal range of motion  She exhibits no edema, tenderness or deformity  Neurological: She is alert and oriented to person, place, and time  Skin: Skin is warm and dry  No rash noted  She is not diaphoretic  No erythema  No pallor  Psychiatric: She has a normal mood and affect  Her behavior is normal  Judgment and thought content normal    Nursing note and vitals reviewed  /67 (BP Location: Left arm, Patient Position: Sitting, Cuff Size: Adult)   Pulse 81   Temp 98 5 °F (36 9 °C)   Ht 5' 4" (1 626 m)   Wt 80 4 kg (177 lb 3 2 oz)   SpO2 97%   BMI 30 42 kg/m²        Pathology - 3 6 cm benign thyroid adenoma connected to normal benign thymus tissue       Howie Petit MD  Thoracic Surgeon

## 2018-09-24 NOTE — PATIENT INSTRUCTIONS
The patient is advised to apply ice or cold packs intermittently as needed to relieve pain  The patient was advised that NSAID-type medications have two very important potential side effects: gastrointestinal irritation including hemorrhage and renal injuries  She was asked to take the medication with food and to stop if she experiences any GI upset  I asked her to call for vomiting, abdominal pain or black/bloody stools  The patient expresses understanding of these issues and questions were answered 
No

## 2018-09-29 ENCOUNTER — APPOINTMENT (OUTPATIENT)
Dept: LAB | Facility: HOSPITAL | Age: 73
End: 2018-09-29
Payer: MEDICARE

## 2018-09-29 ENCOUNTER — TRANSCRIBE ORDERS (OUTPATIENT)
Dept: ADMINISTRATIVE | Facility: HOSPITAL | Age: 73
End: 2018-09-29

## 2018-09-29 DIAGNOSIS — E06.5 HYPOTHYROIDISM DUE TO FIBROUS INVASIVE THYROIDITIS: Primary | ICD-10-CM

## 2018-09-29 DIAGNOSIS — E03.8 HYPOTHYROIDISM DUE TO FIBROUS INVASIVE THYROIDITIS: ICD-10-CM

## 2018-09-29 DIAGNOSIS — E06.5 HYPOTHYROIDISM DUE TO FIBROUS INVASIVE THYROIDITIS: ICD-10-CM

## 2018-09-29 DIAGNOSIS — E03.8 HYPOTHYROIDISM DUE TO FIBROUS INVASIVE THYROIDITIS: Primary | ICD-10-CM

## 2018-09-29 LAB
T3FREE SERPL-MCNC: 2.2 PG/ML (ref 2.3–4.2)
T4 FREE SERPL-MCNC: 1.22 NG/DL (ref 0.76–1.46)
TSH SERPL DL<=0.05 MIU/L-ACNC: 6.19 UIU/ML (ref 0.36–3.74)

## 2018-09-29 PROCEDURE — 84481 FREE ASSAY (FT-3): CPT

## 2018-09-29 PROCEDURE — 84439 ASSAY OF FREE THYROXINE: CPT

## 2018-09-29 PROCEDURE — 84443 ASSAY THYROID STIM HORMONE: CPT

## 2018-09-29 PROCEDURE — 36415 COLL VENOUS BLD VENIPUNCTURE: CPT

## 2018-10-22 ENCOUNTER — OFFICE VISIT (OUTPATIENT)
Dept: CARDIOLOGY CLINIC | Facility: CLINIC | Age: 73
End: 2018-10-22
Payer: MEDICARE

## 2018-10-22 VITALS
HEIGHT: 64 IN | DIASTOLIC BLOOD PRESSURE: 80 MMHG | BODY MASS INDEX: 30.05 KG/M2 | WEIGHT: 176 LBS | OXYGEN SATURATION: 98 % | SYSTOLIC BLOOD PRESSURE: 146 MMHG | HEART RATE: 74 BPM

## 2018-10-22 DIAGNOSIS — R06.00 DYSPNEA ON EXERTION: ICD-10-CM

## 2018-10-22 DIAGNOSIS — E78.2 MIXED HYPERLIPIDEMIA: ICD-10-CM

## 2018-10-22 DIAGNOSIS — I10 HYPERTENSION, UNCONTROLLED: Primary | ICD-10-CM

## 2018-10-22 DIAGNOSIS — I51.89 DIASTOLIC DYSFUNCTION: ICD-10-CM

## 2018-10-22 DIAGNOSIS — I10 ESSENTIAL HYPERTENSION: ICD-10-CM

## 2018-10-22 PROCEDURE — 99214 OFFICE O/P EST MOD 30 MIN: CPT | Performed by: INTERNAL MEDICINE

## 2018-10-22 RX ORDER — LOSARTAN POTASSIUM 100 MG/1
100 TABLET ORAL DAILY
Qty: 90 TABLET | Refills: 3 | Status: SHIPPED | OUTPATIENT
Start: 2018-10-22 | End: 2019-11-05 | Stop reason: SDUPTHER

## 2018-10-22 NOTE — PROGRESS NOTES
Cardiology Consultation     Tricia Woodward  [id number]  1945  [address] [address]    C/C: PREOP EVALUATION PRIOR TO MEDIASTINAL MASS REMOVAL     HPI: 59-year-old female patient with past medical history of diabetes, hypertension, hyperlipidemia and former smoker is here for a follow-up for hypertension  Patient was last seen for preop evaluation prior to resection of thymoma  Her blood pressure was elevated she was asked to monitor blood pressure and call me for systolic blood pressure is elevated  Patient does monitor blood pressure and it is pain persistently elevated in home  Patient is not very active but denies having any chest pain on exertion  She does get occasional shortness of breath on exertion especially when she is will taking stairs  She also reports tiredness  She denies having any palpitations dizziness or syncope  She does have mild lower extremity edema which he says is chronic and had for last 5-6 years  Her diabetes is better controlled now after medications were adjusted    Nuclear stress test was normal    Echocardiogram:  LEFT VENTRICLE:  Systolic function was normal  Ejection fraction was estimated in the range of 55 % to 65 %  There were no regional wall motion abnormalities    Features were consistent with a pseudonormal left ventricular filling pattern, with concomitant abnormal relaxation and increased filling pressure (grade 2 diastolic dysfunction)      MITRAL VALVE:  There was mild regurgitation      TRICUSPID VALVE:  There was mild regurgitation    Patient Active Problem List   Diagnosis    Impingement syndrome of right shoulder    Mediastinal mass    Preop cardiovascular exam    Essential hypertension    Mixed hyperlipidemia    Dyspnea on exertion     Past Medical History:   Diagnosis Date    Anxiety     Diabetes mellitus ([facility] [address] )     Disease of thyroid gland     Hyperlipidemia     Hypertension     Mediastinal mass     Right shoulder pain      Social History     Social History    Marital status: /Civil Union     Spouse name: N/A    Number of children: N/A    Years of education: N/A     Occupational History    Not on file       Social History Main Topics    Smoking status: Former Smoker     Packs/day: 0 50     Years: 20 00     Quit date: 1987    Smokeless tobacco: Never Used    Alcohol use No    Drug use: No    Sexual activity: Not on file     Other Topics Concern    Not on file     Social History Narrative    No narrative on file      Family History   Problem Relation Age of Onset    Heart disease Mother     Cancer Father     Heart disease Father     Diabetes Brother      Past Surgical History:   Procedure Laterality Date    APPENDECTOMY      CHOLECYSTECTOMY      EYE SURGERY      KNEE SURGERY      THYMECTOMY Left 9/7/2018    Procedure: ROBOTIC ASSISTED TOTAL THYMECTOMY;  Surgeon: Tamara Maddox MD;  [facility];  Service: Thoracic    THYMECTOMY N/A 9/7/2018    Procedure: TRANSVERSE CERVICAL INCISION TO EXTRACT SPECIMENT;  Surgeon: Tamara Maddox MD;  [facility];  Service: Thoracic    THYROID SURGERY         Current Outpatient Prescriptions:     Acetaminophen (APAP) 325 MG tablet, 650 mg PO q8  Hrs for 7 days, Disp: 22 tablet, Rfl: 0    aspirin 81 mg chewable tablet, Chew 81 mg daily, Disp: , Rfl:     cyanocobalamin (VITAMIN B-12) 500 mcg tablet, Take 500 mcg by mouth daily, Disp: , Rfl: 1    Difluprednate (DUREZOL) 0 05 % EMUL, FOR USE AFTER SURGERY INSTILL 1 DROP INTO RIGHT EYE FOUR TIMES DAILY, Disp: , Rfl:     docusate sodium (COLACE) 100 mg capsule, Take 1 capsule (100 mg total) by mouth 2 (two) times a day, Disp: 10 capsule, Rfl: 0    insulin glargine (LANTUS) 100 units/mL subcutaneous injection, Inject 30 Units under the skin daily at bedtime, Disp: , Rfl:   insulin lispro (HumaLOG) 100 units/mL injection pen, Dispense admelog, 8 units with each meal plus sliding scale  Max dose 40 units daily  5 pens per 30 days, Disp: , Rfl:     insulin NPH-insulin regular (NovoLIN 70/30) 100 units/mL subcutaneous injection, Inject 8 Units under the skin 3 (three) times a day before meals  , Disp: , Rfl:     levothyroxine 100 mcg tablet, Take 1 tablet (100 mcg total) by mouth daily, Disp: 30 tablet, Rfl: 0    losartan (COZAAR) 50 mg tablet, Take 50 mg by mouth daily, Disp: , Rfl:     metFORMIN (GLUCOPHAGE) 1000 MG tablet, Take 1,000 mg by mouth 2 (two) times a day with meals, Disp: , Rfl:     Nepafenac (ILEVRO) 0 3 % SUSP, INSTILL 1 DROP INTO RIGHT EYE DAILY, Disp: , Rfl:     senna (SENOKOT) 8 6 mg, Take 1 tablet (8 6 mg total) by mouth daily at bedtime, Disp: 120 each, Rfl: 0  No Known Allergies  Vitals:    10/22/18 1629   BP: 146/80   BP Location: Left arm   Patient Position: Sitting   Cuff Size: Adult   Pulse: 74   SpO2: 98%   Weight: 79 8 kg (176 lb)   Height: 5' 4" (1 626 m)       Labs:  Appointment on 09/29/2018   Component Date Value    TSH 3RD GENERATON 09/29/2018 6 187*    T3, Free 09/29/2018 2 20*    Free T4 09/29/2018 1 22    Admission on 09/07/2018, Discharged on 09/10/2018   Component Date Value    POC Glucose 09/07/2018 142*    Case Report 09/07/2018                      Value:Surgical Pathology Report                         Case: [id number]                                   Authorizing Provider:  Muna Woodson MD      Collected:           09/07/2018 1124              Ordering Location:     [address] [place] [address] Road      Received:            09/07/2018 [address]                                                      Pathologist:           Blanche Colin MD                                                                Specimen:    Thymus, THYMECTOMY AND ANTERIOR MEDIASTINAL MASS  Final Diagnosis 09/07/2018                      Value: This result contains rich text formatting which cannot be displayed here   Additional Information 09/07/2018                      Value: This result contains rich text formatting which cannot be displayed here  [facility] Gross Description 09/07/2018                      Value: This result contains rich text formatting which cannot be displayed here      Clinical Information 09/07/2018                      Value:Mediastinal mass    POC Glucose 09/07/2018 245*    POC Glucose 09/07/2018 284*    POC Glucose 09/07/2018 300*    Sodium 09/08/2018 132*    Potassium 09/08/2018 4 1     Chloride 09/08/2018 100     CO2 09/08/2018 26     ANION GAP 09/08/2018 6     BUN 09/08/2018 11     Creatinine 09/08/2018 0 67     Glucose 09/08/2018 226*    Calcium 09/08/2018 7 9*    AST 09/08/2018 20     ALT 09/08/2018 27     Alkaline Phosphatase 09/08/2018 60     Total Protein 09/08/2018 7 1     Albumin 09/08/2018 3 1*    Total Bilirubin 09/08/2018 0 55     eGFR 09/08/2018 89     Magnesium 09/08/2018 2 2     POC Glucose 09/07/2018 259*    WBC 09/08/2018 10 31*    RBC 09/08/2018 3 93     Hemoglobin 09/08/2018 10 9*    Hematocrit 09/08/2018 35 2     MCV 09/08/2018 90     MCH 09/08/2018 27 7     MCHC 09/08/2018 31 0*    RDW 09/08/2018 14 4     Platelets 07/06/2061 267     MPV 09/08/2018 10 3     POC Glucose 09/08/2018 213*    WBC 09/08/2018 11 62*    RBC 09/08/2018 4 23     Hemoglobin 09/08/2018 12 0     Hematocrit 09/08/2018 37 8     MCV 09/08/2018 89     MCH 09/08/2018 28 4     MCHC 09/08/2018 31 7     RDW 09/08/2018 14 5     MPV 09/08/2018 10 0     Platelets 33/24/3740 288     nRBC 09/08/2018 0     Neutrophils Relative 09/08/2018 86*    Immat GRANS % 09/08/2018 1     Lymphocytes Relative 09/08/2018 7*    Monocytes Relative 09/08/2018 6     Eosinophils Relative 09/08/2018 0     Basophils Relative 09/08/2018 0     Neutrophils Absolute 09/08/2018 10 10*    Immature Grans Absolute 09/08/2018 0 07     Lymphocytes Absolute 09/08/2018 0 76     Monocytes Absolute 09/08/2018 0 67     Eosinophils Absolute 09/08/2018 0 01     Basophils Absolute 09/08/2018 0 01     POC Glucose 09/08/2018 223*    POC Glucose 09/08/2018 240*    POC Glucose 09/08/2018 256*    POC Glucose 09/09/2018 141*    POC Glucose 09/09/2018 297*    POC Glucose 09/09/2018 178*    POC Glucose 09/09/2018 202*    POC Glucose 09/10/2018 174*    POC Glucose 09/10/2018 214*   Lab Requisition on 09/01/2018   Component Date Value    ABO Grouping 09/01/2018 O     Rh Factor 09/01/2018 Negative     Antibody Screen 09/01/2018 Negative     Specimen Expiration Date 09/01/2018 [id number]    Appointment on 09/01/2018   Component Date Value    WBC 09/01/2018 4 54     RBC 09/01/2018 4 70     Hemoglobin 09/01/2018 13 3     Hematocrit 09/01/2018 41 6     MCV 09/01/2018 89     MCH 09/01/2018 28 3     MCHC 09/01/2018 32 0     RDW 09/01/2018 14 4     MPV 09/01/2018 9 9     Platelets 00/36/8302 259     nRBC 09/01/2018 0     Neutrophils Relative 09/01/2018 62     Immat GRANS % 09/01/2018 0     Lymphocytes Relative 09/01/2018 28     Monocytes Relative 09/01/2018 7     Eosinophils Relative 09/01/2018 3     Basophils Relative 09/01/2018 0     Neutrophils Absolute 09/01/2018 2 80     Immature Grans Absolute 09/01/2018 0 02     Lymphocytes Absolute 09/01/2018 1 25     Monocytes Absolute 09/01/2018 0 32     Eosinophils Absolute 09/01/2018 0 13     Basophils Absolute 09/01/2018 0 02     Sodium 09/01/2018 139     Potassium 09/01/2018 3 9     Chloride 09/01/2018 103     CO2 09/01/2018 30     ANION GAP 09/01/2018 6     BUN 09/01/2018 11     Creatinine 09/01/2018 0 67     Glucose, Fasting 09/01/2018 150*    Calcium 09/01/2018 9 0     eGFR 09/01/2018 89     Protime 09/01/2018 14 3*    INR 09/01/2018 1 12     PTT 09/01/2018 30     AChR Binding Ab, Serum 09/01/2018 <0 03     AChR Blocking Abs, Serum 09/01/2018 21     AChR Modulating Abs 09/01/2018 [facility]   Component Date Value    Protocol Name 08/31/2018 LEXISCAN-SIT     Time In Exercise Phase 08/31/2018 00:03:00     MAX  SYSTOLIC BP 73/50/3293 336     Max Diastolic Bp 32/55/5691 84     Max Heart Rate 08/31/2018 98     Max Predicted Heart Rate 08/31/2018 149     Reason for Termination 08/31/2018 Protocol Complete     Test Indication 08/31/2018                      Value:Dyspnea on effort  Pre-Op Evaluation      Target Hr Formular 08/31/2018 (220 - Age)*85%      Imaging: No results found  Review of Systems:  Review of Systems   Constitutional: Negative for diaphoresis and fatigue  HENT: Negative for congestion and facial swelling  Eyes: Negative for photophobia and visual disturbance  Respiratory: Positive for shortness of breath  Negative for chest tightness  Cardiovascular: Negative for chest pain, palpitations and leg swelling  Gastrointestinal: Negative for abdominal pain and nausea  Endocrine: Negative for cold intolerance and heat intolerance  Musculoskeletal: Negative for arthralgias and myalgias  Skin: Negative for pallor and rash  Neurological: Negative for dizziness and tremors  Psychiatric/Behavioral: Negative for sleep disturbance  The patient is not nervous/anxious  Physical Exam:  Physical Exam   Constitutional: She is oriented to person, place, and time  She appears well-developed and well-nourished  HENT:   Head: Normocephalic and atraumatic  Eyes: Pupils are equal, round, and reactive to light  Conjunctivae and EOM are normal    Neck: Normal range of motion  Neck supple  No JVD present  No thyromegaly present  Cardiovascular: Normal rate, regular rhythm, S1 normal, S2 normal, normal heart sounds and intact distal pulses  Exam reveals no gallop and no friction rub  No murmur heard    Pulses:       Carotid pulses are 2+ on the right side, and 2+ on the left side  Pulmonary/Chest: Effort normal and breath sounds normal  No respiratory distress  She has no wheezes  She has no rales  Abdominal: Soft  Bowel sounds are normal  She exhibits no distension  There is no tenderness  There is no rebound and no guarding  Musculoskeletal: Normal range of motion  She exhibits no edema  Neurological: She is alert and oriented to person, place, and time  She has normal reflexes  No cranial nerve deficit  Skin: Skin is warm and dry  Psychiatric: She has a normal mood and affect  Discuss the results of nuclear stress test echocardiogram with the patient    Discussion/Summary:   1  Hypertension:  THE BLOOD PRESSURE REMAINS ELEVATED  Her goal is systolic blood pressures less than 130 as she is diabetic  Increase losartan to 100 mg       2  Hyperlipidemia:  Check lipid profile  Goal LDL less than 100 as she is diabetic  3  Grade 2 diastolic dysfunction with no evidence of CHF  She needs better blood pressure control        Follow-up in 6 months

## 2018-10-24 ENCOUNTER — OFFICE VISIT (OUTPATIENT)
Dept: CARDIAC SURGERY | Facility: CLINIC | Age: 73
End: 2018-10-24

## 2018-10-24 VITALS
WEIGHT: 176.2 LBS | BODY MASS INDEX: 30.08 KG/M2 | DIASTOLIC BLOOD PRESSURE: 71 MMHG | TEMPERATURE: 97.5 F | OXYGEN SATURATION: 92 % | HEIGHT: 64 IN | HEART RATE: 74 BPM | SYSTOLIC BLOOD PRESSURE: 158 MMHG

## 2018-10-24 DIAGNOSIS — J98.59 MEDIASTINAL MASS: Primary | ICD-10-CM

## 2018-10-24 PROCEDURE — 99024 POSTOP FOLLOW-UP VISIT: CPT | Performed by: THORACIC SURGERY (CARDIOTHORACIC VASCULAR SURGERY)

## 2018-10-24 RX ORDER — CETIRIZINE HYDROCHLORIDE 5 MG/1
5 TABLET ORAL DAILY
Qty: 30 TABLET | Refills: 3 | Status: SHIPPED | OUTPATIENT
Start: 2018-10-24 | End: 2019-07-31

## 2018-10-24 NOTE — LETTER
October 24, 2018     Scarlett Peres, [address] [address]    Patient: Angelo Flaherty   YOB: 1945   Date of Visit: 10/24/2018       Dear Dr Madi Noguera: Thank you for referring Steven Barrett to me for evaluation  Below are my notes for this consultation  If you have questions, please do not hesitate to call me  I look forward to following your patient along with you  Sincerely,        Ashlie High MD        CC: No Recipients  Ashlie High MD  10/24/2018  5:22 PM  Sign at close encounter  Thoracic Follow-Up  Assessment/Plan:   71-year-old female status post 09/07/2018 robotic assisted total thymectomy transverse cervical incision for a mediastinal mass  This proved to be a 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  She continues to have small residual issues from this surgery  Her thyroid replacement hormone was increased and she has felt better since this time  She has a persistent cough, mild hoarseness, postnasal drip and sinus issues  I am starting to work this up by 1st checking AP and lateral chest x-ray  I will call him with results of this  I have also encouraged the patient to take daily Zyrtec for what seems to be consistent with seasonal allergies given her new living environment  I have written her for Zyrtec 5 milligrams p o  daily  I would like her to follow up with me in 1 months time to see how she improves on the Zyrtec  If she has no improvement then I would refer her to ENT for evaluation and likely panendoscopy  Patient is indicated that she may return [facility]  Some of this treatment time on will have to be delayed until after that visit  From a thymectomy standpoint I would like her to get a repeat CT scan of the chest with IV contrast 1 year postoperative  We will have her follow up with us after that      The patient and her daughter-in-law voiced understanding at this treatment plan  We again provide our phone number and asked him to call us with any questions or concerns moving forward  I will call them following her chest x-ray with any results  Diagnoses and all orders for this visit:    Mediastinal mass  -     CT chest w contrast; Future  -     BUN/Creatinine Ratio; Future  -     XR chest pa & lateral; Future  -     cetirizine (ZyrTEC) 5 MG tablet; Take 1 tablet (5 mg total) by mouth daily          Thoracic History          Subjective:    Patient ID: Caterina Strong is a 68 y o  female  HPI  Kings Webb returns to our office today with her daughter in law s/p 9/7/18 robotic assisted total thymectomy with transverse cervical incision for resection of a mediastinal mass  Final pathology proved this to be a 3 6 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  She had a previous history of transcervical thyroidectomy but this was likely ectopic tissue  Our office she was seen by her primary care physician  They increased her dose of thyroid replacement hormone  She has noticed increasing energy level since that time an improved appetite  She has also been seen by her cardiologist to went up on her losartan blood pressure medication with adequate response  Kings Webb has been doing well overall and has been much more active  She no longer has any pain  She occasionally gets discomfort at inferior-most incision site but this is less frequent  She denies any wound issues  She denies any fevers or chills  She does note a daily nonproductive cough  This does tend to keep her up at night  She has been using cough suppressants and cough drops for this without much improvement  She initially had a hoarse voice after surgery  This has slowly improved but is still bothering her somewhat  She also mentions a runny nose and what seems like sinus pressure   Kings Webb is originally from [facility] and has not spent significant time in [place] at this time of year  The following portions of the patient's history were reviewed and updated as appropriate: allergies, current medications, past family history, past medical history, past social history, past surgical history and problem list     Review of Systems   Constitutional: Negative for activity change, appetite change, chills, diaphoresis, fatigue, fever and unexpected weight change  HENT: Positive for postnasal drip, sinus pressure, sore throat and voice change  Eyes: Negative  Respiratory: Positive for cough  Negative for apnea, chest tightness, shortness of breath, wheezing and stridor  Cardiovascular: Negative for chest pain, palpitations and leg swelling  Gastrointestinal: Negative for abdominal distention, abdominal pain, blood in stool, constipation, diarrhea, nausea and vomiting  Endocrine: Negative  Genitourinary: Negative  Musculoskeletal: Positive for arthralgias  Skin: Negative  Allergic/Immunologic: Negative  Neurological: Negative  Hematological: Negative  Psychiatric/Behavioral: Negative  Obje improved but is not ctive:   Physical Exam   Constitutional: She is oriented to person, place, and time  She appears well-developed and well-nourished  No distress  HENT:   Head: Normocephalic and atraumatic  Mouth/Throat: Oropharynx is clear and moist  No oropharyngeal exudate  No orophraryngeal erythema or masses  Eyes: Pupils are equal, round, and reactive to light  Conjunctivae and EOM are normal  No scleral icterus  Neck: Normal range of motion  Neck supple  No JVD present  No tracheal deviation present  No thyromegaly present  Cardiovascular: Normal rate, regular rhythm, normal heart sounds and intact distal pulses  Exam reveals no gallop and no friction rub  No murmur heard  Pulmonary/Chest: Effort normal and breath sounds normal  No respiratory distress  She has no wheezes  She has no rales  She exhibits no tenderness  Left chest wall incisions clean dry and intact  No erythema or drainage  Abdominal: Soft  Bowel sounds are normal  She exhibits no distension and no mass  There is no tenderness  There is no rebound and no guarding  Musculoskeletal: Normal range of motion  She exhibits no edema, tenderness or deformity  Neurological: She is alert and oriented to person, place, and time  Skin: Skin is warm and dry  No rash noted  She is not diaphoretic  No erythema  No pallor  Psychiatric: She has a normal mood and affect  Her behavior is normal  Judgment and thought content normal    Nursing note and vitals reviewed    /71 (BP Location: Right arm, Patient Position: Sitting, Cuff Size: Adult)   Pulse 74   Temp 97 5 °F (36 4 °C)   Ht 5' 4" (1 626 m)   Wt 79 9 kg (176 lb 3 2 oz)   SpO2 92%   BMI 30 24 kg/m²        Erasmo Pepper MD  Thoracic Surgeon

## 2018-10-24 NOTE — PROGRESS NOTES
Thoracic Follow-Up  Assessment/Plan:   71-year-old female status post 09/07/2018 robotic assisted total thymectomy transverse cervical incision for a mediastinal mass  This proved to be a 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  She continues to have small residual issues from this surgery  Her thyroid replacement hormone was increased and she has felt better since this time  She has a persistent cough, mild hoarseness, postnasal drip and sinus issues  I am starting to work this up by 1st checking AP and lateral chest x-ray  I will call him with results of this  I have also encouraged the patient to take daily Zyrtec for what seems to be consistent with seasonal allergies given her new living environment  I have written her for Zyrtec 5 milligrams p o  daily  I would like her to follow up with me in 1 months time to see how she improves on the Zyrtec  If she has no improvement then I would refer her to ENT for evaluation and likely panendoscopy  Patient is indicated that she may return to [facility] soon  Some of this treatment time on will have to be delayed until after that visit  From a thymectomy standpoint I would like her to get a repeat CT scan of the chest with IV contrast 1 year postoperative  We will have her follow up with us after that  The patient and her daughter-in-law voiced understanding at this treatment plan  We again provide our phone number and asked him to call us with any questions or concerns moving forward  I will call them following her chest x-ray with any results  Diagnoses and all orders for this visit:    Mediastinal mass  -     CT chest w contrast; Future  -     BUN/Creatinine Ratio; Future  -     XR chest pa & lateral; Future  -     cetirizine (ZyrTEC) 5 MG tablet; Take 1 tablet (5 mg total) by mouth daily          Thoracic History          Subjective:    Patient ID: Anibal Simon is a 68 y o  female      BRIT Ramachandran returns to our office today with her daughter in law s/p 9/7/18 robotic assisted total thymectomy with transverse cervical incision for resection of a mediastinal mass  Final pathology proved this to be a 3 6 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  She had a previous history of transcervical thyroidectomy but this was likely ectopic tissue  Our office she was seen by her primary care physician  They increased her dose of thyroid replacement hormone  She has noticed increasing energy level since that time an improved appetite  She has also been seen by her cardiologist to went up on her losartan blood pressure medication with adequate response  Fuad Rivera has been doing well overall and has been much more active  She no longer has any pain  She occasionally gets discomfort at inferior-most incision site but this is less frequent  She denies any wound issues  She denies any fevers or chills  She does note a daily nonproductive cough  This does tend to keep her up at night  She has been using cough suppressants and cough drops for this without much improvement  She initially had a hoarse voice after surgery  This has slowly improved but is still bothering her somewhat  She also mentions a runny nose and what seems like sinus pressure  Fuad Rivera is originally from [facility] and has not spent significant time in [place] at this time of year  The following portions of the patient's history were reviewed and updated as appropriate: allergies, current medications, past family history, past medical history, past social history, past surgical history and problem list     Review of Systems   Constitutional: Negative for activity change, appetite change, chills, diaphoresis, fatigue, fever and unexpected weight change  HENT: Positive for postnasal drip, sinus pressure, sore throat and voice change  Eyes: Negative  Respiratory: Positive for cough   Negative for apnea, chest tightness, shortness of breath, wheezing and stridor  Cardiovascular: Negative for chest pain, palpitations and leg swelling  Gastrointestinal: Negative for abdominal distention, abdominal pain, blood in stool, constipation, diarrhea, nausea and vomiting  Endocrine: Negative  Genitourinary: Negative  Musculoskeletal: Positive for arthralgias  Skin: Negative  Allergic/Immunologic: Negative  Neurological: Negative  Hematological: Negative  Psychiatric/Behavioral: Negative  Obje improved but is not ctive:   Physical Exam   Constitutional: She is oriented to person, place, and time  She appears well-developed and well-nourished  No distress  HENT:   Head: Normocephalic and atraumatic  Mouth/Throat: Oropharynx is clear and moist  No oropharyngeal exudate  No orophraryngeal erythema or masses  Eyes: Pupils are equal, round, and reactive to light  Conjunctivae and EOM are normal  No scleral icterus  Neck: Normal range of motion  Neck supple  No JVD present  No tracheal deviation present  No thyromegaly present  Cardiovascular: Normal rate, regular rhythm, normal heart sounds and intact distal pulses  Exam reveals no gallop and no friction rub  No murmur heard  Pulmonary/Chest: Effort normal and breath sounds normal  No respiratory distress  She has no wheezes  She has no rales  She exhibits no tenderness  Left chest wall incisions clean dry and intact  No erythema or drainage  Abdominal: Soft  Bowel sounds are normal  She exhibits no distension and no mass  There is no tenderness  There is no rebound and no guarding  Musculoskeletal: Normal range of motion  She exhibits no edema, tenderness or deformity  Neurological: She is alert and oriented to person, place, and time  Skin: Skin is warm and dry  No rash noted  She is not diaphoretic  No erythema  No pallor  Psychiatric: She has a normal mood and affect   Her behavior is normal  Judgment and thought content normal  Nursing note and vitals reviewed    /71 (BP Location: Right arm, Patient Position: Sitting, Cuff Size: Adult)   Pulse 74   Temp 97 5 °F (36 4 °C)   Ht 5' 4" (1 626 m)   Wt 79 9 kg (176 lb 3 2 oz)   SpO2 92%   BMI 30 24 kg/m²       Alba Sweeney MD  Thoracic Surgeon

## 2019-04-01 ENCOUNTER — TELEPHONE (OUTPATIENT)
Dept: CARDIAC SURGERY | Facility: CLINIC | Age: 74
End: 2019-04-01

## 2019-04-15 PROBLEM — R49.0 HOARSENESS: Status: ACTIVE | Noted: 2019-04-15

## 2019-04-15 PROBLEM — J38.01 PARALYSIS OF LEFT VOCAL CORD: Status: ACTIVE | Noted: 2019-04-15

## 2019-05-22 ENCOUNTER — OFFICE VISIT (OUTPATIENT)
Dept: CARDIOLOGY CLINIC | Facility: CLINIC | Age: 74
End: 2019-05-22
Payer: MEDICARE

## 2019-05-22 VITALS
HEART RATE: 75 BPM | OXYGEN SATURATION: 96 % | DIASTOLIC BLOOD PRESSURE: 70 MMHG | BODY MASS INDEX: 29.53 KG/M2 | SYSTOLIC BLOOD PRESSURE: 132 MMHG | WEIGHT: 173 LBS | HEIGHT: 64 IN

## 2019-05-22 DIAGNOSIS — I34.0 NONRHEUMATIC MITRAL VALVE REGURGITATION: ICD-10-CM

## 2019-05-22 DIAGNOSIS — I36.1 NONRHEUMATIC TRICUSPID VALVE REGURGITATION: ICD-10-CM

## 2019-05-22 DIAGNOSIS — I51.89 DIASTOLIC DYSFUNCTION: ICD-10-CM

## 2019-05-22 DIAGNOSIS — I10 ESSENTIAL HYPERTENSION: Primary | ICD-10-CM

## 2019-05-22 DIAGNOSIS — E78.2 MIXED HYPERLIPIDEMIA: ICD-10-CM

## 2019-05-22 PROCEDURE — 99214 OFFICE O/P EST MOD 30 MIN: CPT | Performed by: INTERNAL MEDICINE

## 2019-06-29 ENCOUNTER — APPOINTMENT (OUTPATIENT)
Dept: LAB | Facility: HOSPITAL | Age: 74
End: 2019-06-29
Attending: INTERNAL MEDICINE
Payer: MEDICARE

## 2019-06-29 DIAGNOSIS — I10 ESSENTIAL HYPERTENSION: ICD-10-CM

## 2019-06-29 LAB
ALBUMIN SERPL BCP-MCNC: 3.7 G/DL (ref 3.5–5)
ALP SERPL-CCNC: 67 U/L (ref 46–116)
ALT SERPL W P-5'-P-CCNC: 33 U/L (ref 12–78)
ANION GAP SERPL CALCULATED.3IONS-SCNC: 10 MMOL/L (ref 4–13)
AST SERPL W P-5'-P-CCNC: 19 U/L (ref 5–45)
BASOPHILS # BLD AUTO: 0.03 THOUSANDS/ΜL (ref 0–0.1)
BASOPHILS NFR BLD AUTO: 1 % (ref 0–1)
BILIRUB SERPL-MCNC: 0.5 MG/DL (ref 0.2–1)
BUN SERPL-MCNC: 13 MG/DL (ref 5–25)
CALCIUM SERPL-MCNC: 9.1 MG/DL (ref 8.3–10.1)
CHLORIDE SERPL-SCNC: 101 MMOL/L (ref 100–108)
CHOLEST SERPL-MCNC: 214 MG/DL (ref 50–200)
CO2 SERPL-SCNC: 28 MMOL/L (ref 21–32)
CREAT SERPL-MCNC: 0.76 MG/DL (ref 0.6–1.3)
EOSINOPHIL # BLD AUTO: 0.11 THOUSAND/ΜL (ref 0–0.61)
EOSINOPHIL NFR BLD AUTO: 2 % (ref 0–6)
ERYTHROCYTE [DISTWIDTH] IN BLOOD BY AUTOMATED COUNT: 14.8 % (ref 11.6–15.1)
GFR SERPL CREATININE-BSD FRML MDRD: 79 ML/MIN/1.73SQ M
GLUCOSE P FAST SERPL-MCNC: 199 MG/DL (ref 65–99)
HCT VFR BLD AUTO: 40.9 % (ref 34.8–46.1)
HDLC SERPL-MCNC: 47 MG/DL (ref 40–60)
HGB BLD-MCNC: 13.2 G/DL (ref 11.5–15.4)
IMM GRANULOCYTES # BLD AUTO: 0.02 THOUSAND/UL (ref 0–0.2)
IMM GRANULOCYTES NFR BLD AUTO: 0 % (ref 0–2)
LDLC SERPL CALC-MCNC: 121 MG/DL (ref 0–100)
LYMPHOCYTES # BLD AUTO: 1.35 THOUSANDS/ΜL (ref 0.6–4.47)
LYMPHOCYTES NFR BLD AUTO: 28 % (ref 14–44)
MCH RBC QN AUTO: 28.3 PG (ref 26.8–34.3)
MCHC RBC AUTO-ENTMCNC: 32.3 G/DL (ref 31.4–37.4)
MCV RBC AUTO: 88 FL (ref 82–98)
MONOCYTES # BLD AUTO: 0.36 THOUSAND/ΜL (ref 0.17–1.22)
MONOCYTES NFR BLD AUTO: 7 % (ref 4–12)
NEUTROPHILS # BLD AUTO: 3 THOUSANDS/ΜL (ref 1.85–7.62)
NEUTS SEG NFR BLD AUTO: 62 % (ref 43–75)
NRBC BLD AUTO-RTO: 0 /100 WBCS
PLATELET # BLD AUTO: 272 THOUSANDS/UL (ref 149–390)
PMV BLD AUTO: 10.3 FL (ref 8.9–12.7)
POTASSIUM SERPL-SCNC: 4.1 MMOL/L (ref 3.5–5.3)
PROT SERPL-MCNC: 7.7 G/DL (ref 6.4–8.2)
RBC # BLD AUTO: 4.66 MILLION/UL (ref 3.81–5.12)
SODIUM SERPL-SCNC: 139 MMOL/L (ref 136–145)
TRIGL SERPL-MCNC: 229 MG/DL
WBC # BLD AUTO: 4.87 THOUSAND/UL (ref 4.31–10.16)

## 2019-06-29 PROCEDURE — 85025 COMPLETE CBC W/AUTO DIFF WBC: CPT | Performed by: INTERNAL MEDICINE

## 2019-06-29 PROCEDURE — 80053 COMPREHEN METABOLIC PANEL: CPT | Performed by: INTERNAL MEDICINE

## 2019-06-29 PROCEDURE — 80061 LIPID PANEL: CPT

## 2019-06-29 PROCEDURE — 36415 COLL VENOUS BLD VENIPUNCTURE: CPT | Performed by: INTERNAL MEDICINE

## 2019-07-01 DIAGNOSIS — E78.2 MIXED HYPERLIPIDEMIA: Primary | ICD-10-CM

## 2019-07-01 RX ORDER — ATORVASTATIN CALCIUM 20 MG/1
20 TABLET, FILM COATED ORAL DAILY
Qty: 90 TABLET | Refills: 3 | Status: SHIPPED | OUTPATIENT
Start: 2019-07-01

## 2019-07-03 ENCOUNTER — OFFICE VISIT (OUTPATIENT)
Dept: INTERNAL MEDICINE CLINIC | Facility: CLINIC | Age: 74
End: 2019-07-03
Payer: MEDICARE

## 2019-07-03 VITALS
DIASTOLIC BLOOD PRESSURE: 60 MMHG | BODY MASS INDEX: 32.24 KG/M2 | WEIGHT: 175.2 LBS | HEART RATE: 77 BPM | SYSTOLIC BLOOD PRESSURE: 130 MMHG | OXYGEN SATURATION: 97 % | HEIGHT: 62 IN

## 2019-07-03 DIAGNOSIS — E66.9 OBESITY (BMI 30-39.9): ICD-10-CM

## 2019-07-03 DIAGNOSIS — E55.9 VITAMIN D DEFICIENCY: ICD-10-CM

## 2019-07-03 DIAGNOSIS — G89.29 CHRONIC RIGHT SHOULDER PAIN: ICD-10-CM

## 2019-07-03 DIAGNOSIS — Z12.11 SCREENING FOR COLON CANCER: ICD-10-CM

## 2019-07-03 DIAGNOSIS — M25.511 CHRONIC RIGHT SHOULDER PAIN: ICD-10-CM

## 2019-07-03 DIAGNOSIS — E78.2 MIXED HYPERLIPIDEMIA: ICD-10-CM

## 2019-07-03 DIAGNOSIS — M75.41 IMPINGEMENT SYNDROME OF RIGHT SHOULDER: ICD-10-CM

## 2019-07-03 DIAGNOSIS — E28.39 MENOPAUSE OVARIAN FAILURE: ICD-10-CM

## 2019-07-03 DIAGNOSIS — E11.9 TYPE 2 DIABETES MELLITUS WITHOUT COMPLICATION, WITH LONG-TERM CURRENT USE OF INSULIN (HCC): Primary | ICD-10-CM

## 2019-07-03 DIAGNOSIS — Z79.4 TYPE 2 DIABETES MELLITUS WITHOUT COMPLICATION, WITH LONG-TERM CURRENT USE OF INSULIN (HCC): Primary | ICD-10-CM

## 2019-07-03 DIAGNOSIS — I10 ESSENTIAL HYPERTENSION: ICD-10-CM

## 2019-07-03 PROBLEM — Z98.890 HISTORY OF THYROID SURGERY: Status: ACTIVE | Noted: 2018-08-21

## 2019-07-03 PROCEDURE — 99204 OFFICE O/P NEW MOD 45 MIN: CPT | Performed by: INTERNAL MEDICINE

## 2019-07-03 RX ORDER — LEVOTHYROXINE SODIUM 0.12 MG/1
125 TABLET ORAL DAILY
Qty: 90 TABLET | Refills: 2 | Status: SHIPPED | OUTPATIENT
Start: 2019-07-03 | End: 2019-11-05 | Stop reason: SDUPTHER

## 2019-07-03 RX ORDER — LEVOTHYROXINE SODIUM 0.12 MG/1
125 TABLET ORAL DAILY
Qty: 90 TABLET | Refills: 1 | Status: SHIPPED | OUTPATIENT
Start: 2019-07-03 | End: 2019-07-03 | Stop reason: SDUPTHER

## 2019-07-03 NOTE — PROGRESS NOTES
INTERNAL MEDICINE OFFICE VISIT  [facility] AT [facility], [address]  Tel: [phone]      NAME: Natasha Hernandez  AGE: 68 y o  SEX: female  : 1945   MRN: [id number]    DATE: 7/3/2019  TIME: 7:17 PM      Assessment and Plan:  1  Type 2 diabetes mellitus without complication, with long-term current use of insulin ([address] )    Patient was told to start taking the Lantus 30 units daily along with the Humalog  I will repeat the labs in 3 months  If the blood glucose levels are still high in a month, she will call me back  - insulin glargine (LANTUS SOLOSTAR) 100 units/mL injection pen; Inject 30 Units under the skin daily  Dispense: 5 pen; Refill: 3  - CBC and differential; Future  - Comprehensive metabolic panel; Future  - Hemoglobin A1C; Future  - Lipid panel; Future  - TSH, 3rd generation; Future  - Microalbumin / creatinine urine ratio; Future    2  Essential hypertension  Continue present medication    3  Mixed hyperlipidemia   continue atorvastatin    4  Impingement syndrome of right shoulder   follow-up with Orthopedics    5  Vitamin D deficiency   continue vitamin-D supplement  - Vitamin D 25 hydroxy; Future    6  Obesity (BMI 30-39  9)  BMI Counseling: Body mass index is 32 04 kg/m²  Discussed the patient's BMI with her  The BMI is above average  BMI counseling and education was provided to the patient  Nutrition recommendations include reducing portion sizes, decreasing overall calorie intake and moderation in carbohydrate intake  7  Screening for colon cancer    - Cologuard; Future    8  Menopause ovarian failure    - DXA bone density spine hip and pelvis;  Future      - Counseling Documentation: patient was counseled regarding: instructions for management, risk factor reductions, prognosis, patient and family education, impressions, risks and benefits of treatment options and importance of compliance with treatment  - Medication Side Effects: Adverse side effects of medications were reviewed with the patient/guardian today  Return for follow up visit in  3 months or earlier, if needed  Chief Complaint:  No chief complaint on file  History of Present Illness:    this is a new patient who has moved from [facility] to stay with her son and daughter-in-law  She was accompanied by her daughter-in-law as she does not speak [place]  Type 2 diabetes - she has been uncontrolled recently as she is not taking the Lantus for a long time  Hypertension - blood pressure stable on present medication  Hyperlipidemia -takes atorvastatin regularly  Right shoulder pain -follows up with Orthopedics  Vitamin-D deficiency- will check a vitamin-D level  Obesity -she was told to try to lose weight  Active Problem List:  Patient Active Problem List   Diagnosis    Impingement syndrome of right shoulder    Mediastinal mass    Preop cardiovascular exam    Essential hypertension    Mixed hyperlipidemia    Dyspnea on exertion    Diastolic dysfunction    Paralysis of left vocal cord    Hoarseness    Vitamin D deficiency    History of thyroid surgery    Type 2 diabetes mellitus without complication, with long-term current use of insulin ([facility])    Obesity (BMI 30-39  9)         Past Medical History:  Past Medical History:   Diagnosis Date    Anxiety     Diabetes mellitus ([address] )     Disease of thyroid gland     Hyperlipidemia     Hypertension     Mediastinal mass     Right shoulder pain          Past Surgical History:  Past Surgical History:   Procedure Laterality Date    APPENDECTOMY      CHOLECYSTECTOMY      EYE SURGERY      KNEE SURGERY      THYMECTOMY Left 9/7/2018    Procedure: ROBOTIC ASSISTED TOTAL THYMECTOMY;  Surgeon: Brett Gong MD;  [facility]: [facility];  Service: Thoracic    THYMECTOMY N/A 9/7/2018    Procedure: TRANSVERSE CERVICAL INCISION TO EXTRACT SPECIMENT;  Surgeon: Naresh Cummings Eric Hayes MD;  [facility];  Service: Thoracic    THYROID SURGERY           Family History:  Family History   Problem Relation Age of Onset    Heart disease Mother     Cancer Father     Heart disease Father     Diabetes Brother          Social History:  Social History     Socioeconomic History    Marital status: /Civil Union     Spouse name: None    Number of children: None    Years of education: None    Highest education level: None   Occupational History    None   Social Needs    Financial resource strain: None    Food insecurity:     Worry: None     Inability: None    Transportation needs:     Medical: None     Non-medical: None   Tobacco Use    Smoking status: Former Smoker     Packs/day: 0 50     Years:      Pack years: 10 00     Last attempt to quit:      Years since quittin 5    Smokeless tobacco: Never Used   Substance and Sexual Activity    Alcohol use: No    Drug use: No    Sexual activity: None   Lifestyle    Physical activity:     Days per week: None     Minutes per session: None    Stress: None   Relationships    Social connections:     Talks on phone: None     Gets together: None     Attends Protestant service: None     Active member of club or organization: None     Attends meetings of clubs or organizations: None     Relationship status: None    Intimate partner violence:     Fear of current or ex partner: None     Emotionally abused: None     Physically abused: None     Forced sexual activity: None   Other Topics Concern    None   Social History Narrative    None         Allergies:  No Known Allergies      Medications:    Current Outpatient Medications:     Acetaminophen (APAP) 325 MG tablet, 650 mg PO q8  Hrs for 7 days, Disp: 22 tablet, Rfl: 0    aspirin 81 mg chewable tablet, Chew 81 mg daily, Disp: , Rfl:     atorvastatin (LIPITOR) 20 mg tablet, Take 1 tablet (20 mg total) by mouth daily, Disp: 90 tablet, Rfl: 3    cetirizine (ZyrTEC) 5 MG tablet, Take 1 tablet (5 mg total) by mouth daily, Disp: 30 tablet, Rfl: 3    cyanocobalamin (VITAMIN B-12) 500 mcg tablet, Take 500 mcg by mouth daily, Disp: , Rfl: 1    Difluprednate (DUREZOL) 0 05 % EMUL, FOR USE AFTER SURGERY INSTILL 1 DROP INTO RIGHT EYE FOUR TIMES DAILY, Disp: , Rfl:     insulin lispro (HumaLOG) 100 units/mL injection pen, Dispense admelog, 8 units with each meal plus sliding scale  Max dose 40 units daily  5 pens per 30 days, Disp: , Rfl:     losartan (COZAAR) 100 MG tablet, Take 1 tablet (100 mg total) by mouth daily, Disp: 90 tablet, Rfl: 3    metFORMIN (GLUCOPHAGE) 1000 MG tablet, Take 1,000 mg by mouth 2 (two) times a day with meals, Disp: , Rfl:     insulin glargine (LANTUS SOLOSTAR) 100 units/mL injection pen, Inject 30 Units under the skin daily, Disp: 5 pen, Rfl: 3    levothyroxine 125 mcg tablet, Take 1 tablet (125 mcg total) by mouth daily, Disp: 90 tablet, Rfl: 2      The following portions of the patient's history were reviewed and updated as appropriate: past medical history, past surgical history, family history, social history, allergies, current medications and active problem list       Review of Systems:  Constitutional: Denies fever, chills, weight gain, weight loss, fatigue  Eyes: Denies eye redness, eye discharge, double vision, change in visual acuity  ENT: Denies hearing loss, tinnitus, sneezing, nasal congestion, nasal discharge, sore throat   Respiratory: Denies cough, expectoration, hemoptysis, shortness of breath, wheezing  Cardiovascular: Denies chest pain, palpitations, lower extremity swelling, orthopnea, PND  Gastrointestinal: Denies abdominal pain, heartburn, nausea, vomiting, hematemesis, diarrhea, bloody stools  Genito-Urinary: Denies dysuria, frequency, difficulty in micturition, nocturia, incontinence  Musculoskeletal: Denies back pain, joint pain, muscle pain  Neurologic: Denies confusion, lightheadedness, syncope, headache, focal weakness, sensory changes, seizures  Endocrine: Denies polyuria, polydipsia, temperature intolerance  Allergy and Immunology: Denies hives, insect bite sensitivity  Hematological and Lymphatic: Denies bleeding problems, swollen glands   Psychological: Denies depression, suicidal ideation, anxiety, panic, mood swings  Dermatological: Denies pruritus, rash, skin lesion changes      Vitals:  Vitals:    07/03/19 1559   BP: 130/60   Pulse: 77   SpO2: 97%       Body mass index is 32 04 kg/m²  Weight (last 2 days)     Date/Time   Weight    07/03/19 1559   79 5 (175 2)                Physical Examination:  General: Patient is not in acute distress  Awake, alert, responding to commands  No weight gain or loss  Head: Normocephalic  Atraumatic  Eyes: Conjunctiva and lids with no swelling, erythema or discharge  Both pupils normal sized, round and reactive to light  Sclera nonicteric  ENT: External examination of nose and ear normal  Otoscopic examination shows translucent tympanic membranes with patent canals without erythema  Oropharynx moist with no erythema, edema, exudate or lesions  Neck: Supple  JVP not raised  Trachea midline  No masses  No thyromegaly  Lungs: No signs of increased work of breathing or respiratory distress  Bilateral bronchovascular breath sounds with no crackles or rhonchi  Chest wall: No tenderness  Cardiovascular: Normal PMI  No thrills  Regular rate and rhythm  S1 and S2 normal  No murmur, rub or gallop  Gastrointestinal: Abdomen soft, nontender  No guarding or rigidity  Liver and spleen not palpable  Bowel sounds present  Neurologic: Cranial nerves II-XII intact   Cortical functions normal  Motor system - Reflexes 2+ and symmetrical  Sensations normal  Musculoskeletal: Gait normal  No joint tenderness  Integumentary: Skin normal with no rash or lesions  Lymphatic: No palpable lymph nodes in neck, axilla or groin  Extremities: No clubbing, cyanosis, edema or varicosities  Psychological: Judgement and insight normal  Mood and affect normal      Laboratory Results:  CBC with diff:   Lab Results   Component Value Date    WBC 4 87 2019    RBC 4 66 2019    HGB 13 2 2019    HCT 40 9 2019    MCV 88 2019    MCH 28 3 2019    RDW 14 8 2019     2019       CMP:  Lab Results   Component Value Date    CREATININE 0 76 2019    BUN 13 2019    K 4 1 2019     2019    CO2 28 2019    ALKPHOS 67 2019    ALT 33 2019    AST 19 2019       Lab Results   Component Value Date    MG 2 2 2018       Lab Results   Component Value Date    TROPONINI <0 02 2018    TROPONINI <0 02 2018       Lipid Profile:   No results found for: CHOL  Lab Results   Component Value Date    HDL 47 2019     Lab Results   Component Value Date    LDLCALC 121 (H) 2019     Lab Results   Component Value Date    TRIG 229 (H) 2019       Imaging Results:  Complete pulmonary function test  Pulmonary Function Test Report  Yesica Lyon 71 y o  female MRN: [id number]    Date of Testin2018    Requesting Physician:  Dr Brett Gong    Reason for Testing:  Mediastinal mass    Reference set for interpretation: NHANES III    Procedure: The patient was taken to pulmonary function testing laboratory  The patient demonstrated good effort and cooperation  The results of   this test meet ATS standards for acceptability and repeatability  Data   set appears appropriate for interpretation  Post bronchodilator testing performed after the administration of 2 5mg   albuterol in 3cc normal saline administered via nebulizer per   bronchodilator protocol      Results:  FEV1/FVC Ratio:  113 %  Forced Vital Capacity:  2 31 L, 85 % predicted  FEV1:  2 04 L, 98 % predicted  After administration of bronchodilator FEV1:  2 08 L, 99% predicted    Lung volumes by body nitrogen wash out : Total Lung Capacity 104 %   predicted Residual volume  115 % predicted    DLCO corrected for patients hemoglobin level:  94 %    Interpretation:    Patient had a full lung function testing with spirometry lung volumes and   DLCO  Patient gave a good effort  Results meet the ATS standards for acceptability and repeatability  The flow volume curve is normal   The spirometry is normal   The lung volumes are normal   The DLCO is normal     Cj Alberts [facility]       Health Maintenance:  Health Maintenance   Topic Date Due    Hepatitis C Screening  1945   [employer] Medicare Annual Wellness Visit (AWV)  1945    MAMMOGRAM  1945    DXA SCAN  1945    CRC Screening: Colonoscopy  1945    BMI: Followup Plan  10/18/1964    HEPATITIS B VACCINES (1 of 3 - Risk 3-dose series) 10/18/1965    DTaP,Tdap,and Td Vaccines (1 - Tdap) 10/18/1967    Pneumococcal Vaccine: 65+ Years (1 of 2 - PCV13) 10/18/2011    INFLUENZA VACCINE  07/01/2019    Depression Screening PHQ  08/17/2019    Fall Risk  07/03/2020    Urinary Incontinence Screening  07/03/2020    BMI: Adult  07/03/2020    Pneumococcal Vaccine: Pediatrics (0 to 5 Years) and At-Risk Patients (6 to 59 Years)  Aged Out       There is no immunization history on file for this patient        Nadir Branch MD  7/3/2019,7:17 PM

## 2019-07-23 ENCOUNTER — TELEPHONE (OUTPATIENT)
Dept: INTERNAL MEDICINE CLINIC | Facility: CLINIC | Age: 74
End: 2019-07-23

## 2019-07-23 NOTE — TELEPHONE ENCOUNTER
PT  JOSEFA     HAVING  SKIN  CANCER  REMOVED  FROM  HER  FACE  8/7   BY   [facility]  NEEDS  TO BE  CLEARED  FOR  SURGURY    HAS  FORM   WHERE  CAN  SHE  BE  ADDED   CALL DAUGHTER  YOLI  [phone]    PT WON'T BE  BACK  IN  TOWN  TILL Monday 7/29

## 2019-07-24 NOTE — TELEPHONE ENCOUNTER
PT  DAUGHTER CALLED BACK  CAN  I  USE  A  SAME  DAY  APPT  SPOT  NO  AVAILABLE  OPENINGS  PLEASE  LET  JORGE  KNOW  SO  I  CAN  CALL THEM  BACK  THANKS

## 2019-07-31 ENCOUNTER — CONSULT (OUTPATIENT)
Dept: INTERNAL MEDICINE CLINIC | Facility: CLINIC | Age: 74
End: 2019-07-31
Payer: MEDICARE

## 2019-07-31 VITALS
WEIGHT: 175.4 LBS | HEIGHT: 62 IN | OXYGEN SATURATION: 97 % | BODY MASS INDEX: 32.28 KG/M2 | DIASTOLIC BLOOD PRESSURE: 64 MMHG | SYSTOLIC BLOOD PRESSURE: 138 MMHG | HEART RATE: 76 BPM

## 2019-07-31 DIAGNOSIS — Z11.59 NEED FOR HEPATITIS C SCREENING TEST: ICD-10-CM

## 2019-07-31 DIAGNOSIS — E78.2 MIXED HYPERLIPIDEMIA: ICD-10-CM

## 2019-07-31 DIAGNOSIS — Z01.818 PRE-OP EXAMINATION: Primary | ICD-10-CM

## 2019-07-31 DIAGNOSIS — Z12.39 SCREENING FOR BREAST CANCER: ICD-10-CM

## 2019-07-31 DIAGNOSIS — E11.9 TYPE 2 DIABETES MELLITUS WITHOUT COMPLICATION, WITH LONG-TERM CURRENT USE OF INSULIN (HCC): ICD-10-CM

## 2019-07-31 DIAGNOSIS — I10 ESSENTIAL HYPERTENSION: ICD-10-CM

## 2019-07-31 DIAGNOSIS — Z79.4 TYPE 2 DIABETES MELLITUS WITHOUT COMPLICATION, WITH LONG-TERM CURRENT USE OF INSULIN (HCC): ICD-10-CM

## 2019-07-31 LAB
LEFT EYE DIABETIC RETINOPATHY: NORMAL
LEFT EYE IMAGE QUALITY: NORMAL
LEFT EYE MACULAR EDEMA: NORMAL
LEFT EYE OTHER RETINOPATHY: NORMAL
RIGHT EYE DIABETIC RETINOPATHY: NORMAL
RIGHT EYE IMAGE QUALITY: NORMAL
RIGHT EYE MACULAR EDEMA: NORMAL
RIGHT EYE OTHER RETINOPATHY: NORMAL
SEVERITY (EYE EXAM): NORMAL
SL AMB POCT HEMOGLOBIN AIC: 7.7 (ref ?–6.5)

## 2019-07-31 PROCEDURE — 99214 OFFICE O/P EST MOD 30 MIN: CPT | Performed by: INTERNAL MEDICINE

## 2019-07-31 PROCEDURE — 83036 HEMOGLOBIN GLYCOSYLATED A1C: CPT | Performed by: INTERNAL MEDICINE

## 2019-07-31 PROCEDURE — 92250 FUNDUS PHOTOGRAPHY W/I&R: CPT | Performed by: INTERNAL MEDICINE

## 2019-07-31 RX ORDER — GLIPIZIDE 10 MG/1
10 TABLET, FILM COATED, EXTENDED RELEASE ORAL DAILY
Qty: 90 TABLET | Refills: 1 | Status: SHIPPED | OUTPATIENT
Start: 2019-07-31 | End: 2019-11-05 | Stop reason: SDUPTHER

## 2019-07-31 NOTE — PROGRESS NOTES
INTERNAL MEDICINE PRE-OPERATIVE EVALUATION  [facility]    NAME: Sarah Velez    AGE: 68 y o  SEX: female  : 1945     DATE: 2019    Internal Medicine Pre-Operative Evaluation      Chief Complaint: Pre-operative Evaluation     Surgery: Moh's   Surgery  Anticipated Date of Surgery:  2019  Referring Provider: Genoveva Ramires MD       History of Present Illness:     Sarah Velez is a 68 y o  female who presents to the office today for a preoperative consultation at the request of surgeon,  Dr Emeli Izquierdo, who plans on performing Moh's  surgery on  2019  Planned anesthesia is local and regional  Current anti-platelet/anti-coagulation medications that the patient is prescribed includes: Aspirin  Assessment of Chronic Conditions:   - Diabetes Mellitus:  on medication, not very well controlled   - Hypertension:  well controlled on medication     Assessment of Cardiac Risk:  · Denies unstable or severe angina or MI in the last 6 weeks or history of stent placement in the last year   · Denies decompensated heart failure (e g  New onset heart failure, NYHA functional class IV heart failure, or worsening existing heart failure)  · Denies significant arrhythmias such as high grade AV block, symptomatic ventricular arrhythmia, newly recognized ventricular tachycardia, supraventricular tachycardia with resting heart rate >100, or symptomatic bradycardia  · Denies severe heart valve disease including aortic stenosis or symptomatic mitral stenosis     Exercise Capacity:  · Able to walk 4 blocks without symptoms?: Yes  · Able to walk 2 flights without symptoms?: Yes    Prior Anesthesia Reactions: Yes     Personal history of venous thromboembolic disease? No    History of steroid use for >2 weeks within last year?  No    Review of Systems:     Review of Systems   Constitutional: Negative for chills, diaphoresis, fatigue and fever    HENT: Negative for congestion, ear discharge, ear pain, hearing loss, postnasal drip, rhinorrhea, sinus pressure, sinus pain, sneezing, sore throat and voice change  Eyes: Negative for pain, discharge, redness and visual disturbance  Respiratory: Negative for cough, chest tightness, shortness of breath and wheezing  Cardiovascular: Negative for chest pain, palpitations and leg swelling  Gastrointestinal: Negative for abdominal distention, abdominal pain, blood in stool, constipation, diarrhea, nausea and vomiting  Endocrine: Negative for cold intolerance, heat intolerance, polydipsia, polyphagia and polyuria  Genitourinary: Negative for dysuria, flank pain, frequency, hematuria and urgency  Musculoskeletal: Negative for arthralgias, back pain, gait problem, joint swelling, myalgias, neck pain and neck stiffness  Skin: Negative for rash  Neurological: Negative for dizziness, tremors, syncope, facial asymmetry, speech difficulty, weakness, light-headedness, numbness and headaches  Hematological: Does not bruise/bleed easily  Psychiatric/Behavioral: Negative for behavioral problems, confusion and sleep disturbance  The patient is not nervous/anxious  Current Problem List:     Patient Active Problem List   Diagnosis    Impingement syndrome of right shoulder    Mediastinal mass    Preop cardiovascular exam    Essential hypertension    Mixed hyperlipidemia    Dyspnea on exertion    Diastolic dysfunction    Paralysis of left vocal cord    Hoarseness    Vitamin D deficiency    History of thyroid surgery    Type 2 diabetes mellitus without complication, with long-term current use of insulin ([facility])    Obesity (BMI 30-39  9)       Allergies:     No Known Allergies    Current Medications:       Current Outpatient Medications:     aspirin 81 mg chewable tablet, Chew 81 mg daily, Disp: , Rfl:     atorvastatin (LIPITOR) 20 mg tablet, Take 1 tablet (20 mg total) by mouth daily, Disp: 90 tablet, Rfl: 3    cyanocobalamin (VITAMIN B-12) 500 mcg tablet, Take 500 mcg by mouth daily, Disp: , Rfl: 1    levothyroxine 125 mcg tablet, Take 1 tablet (125 mcg total) by mouth daily, Disp: 90 tablet, Rfl: 2    losartan (COZAAR) 100 MG tablet, Take 1 tablet (100 mg total) by mouth daily, Disp: 90 tablet, Rfl: 3    metFORMIN (GLUCOPHAGE) 1000 MG tablet, Take 1 tablet (1,000 mg total) by mouth 2 (two) times a day with meals, Disp: 180 tablet, Rfl: 1    glipiZIDE (GLUCOTROL XL) 10 mg 24 hr tablet, Take 1 tablet (10 mg total) by mouth daily, Disp: 90 tablet, Rfl: 1    Past Medical and Surgical History:       Past Medical History:   Diagnosis Date    Anxiety     Diabetes mellitus ([address] )     Disease of thyroid gland     Hyperlipidemia     Hypertension     Mediastinal mass     Right shoulder pain         Past Surgical History:   Procedure Laterality Date    APPENDECTOMY      CHOLECYSTECTOMY      EYE SURGERY      KNEE SURGERY      THYMECTOMY Left 9/7/2018    Procedure: ROBOTIC ASSISTED TOTAL THYMECTOMY;  Surgeon: Leann De La Fuente MD;  [facility];  Service: Thoracic    THYMECTOMY N/A 9/7/2018    Procedure: TRANSVERSE CERVICAL INCISION TO EXTRACT SPECIMENT;  Surgeon: Leann De La Fuente MD;  [facility];  Service: Thoracic    THYROID SURGERY          Family History   Problem Relation Age of Onset    Heart disease Mother     Cancer Father     Heart disease Father     Diabetes Brother         Social History     Socioeconomic History    Marital status: /Civil Union     Spouse name: Not on file    Number of children: Not on file    Years of education: Not on file    Highest education level: Not on file   Occupational History    Not on file   Social Needs    Financial resource strain: Not on file    Food insecurity:     Worry: Not on file     Inability: Not on file    Transportation needs:     Medical: Not on file     Non-medical: Not on file   Tobacco Use    Smoking status: Former Smoker     Packs/day: 0 50     Years: 20 00     Pack years: 10 00     Last attempt to quit:      Years since quittin 6    Smokeless tobacco: Never Used   Substance and Sexual Activity    Alcohol use: No    Drug use: No    Sexual activity: Not on file   Lifestyle    Physical activity:     Days per week: 7 days     Minutes per session: 130 min    Stress: Not at all   Relationships    Social connections:     Talks on phone: Not on file     Gets together: Not on file     Attends [employer] service: Not on file     Active member of club or organization: Not on file     Attends meetings of clubs or organizations: Not on file     Relationship status: Not on file    Intimate partner violence:     Fear of current or ex partner: Not on file     Emotionally abused: Not on file     Physically abused: Not on file     Forced sexual activity: Not on file   Other Topics Concern    Not on file   Social History Narrative    Not on file        Physical Exam:     Physical Exam   Constitutional: She is oriented to person, place, and time  She appears well-developed and well-nourished  No distress  HENT:   Head: Normocephalic and atraumatic  Right Ear: External ear normal    Left Ear: External ear normal    Nose: Nose normal    Mouth/Throat: Oropharynx is clear and moist    Eyes: Conjunctivae and EOM are normal  Right eye exhibits no discharge  Left eye exhibits no discharge  No scleral icterus  Neck: Normal range of motion  Neck supple  No JVD present  No tracheal deviation present  No thyromegaly present  Cardiovascular: Normal rate, regular rhythm, normal heart sounds and intact distal pulses  Exam reveals no gallop and no friction rub  Pulses are no weak pulses  No murmur heard  Pulses:       Dorsalis pedis pulses are 2+ on the right side, and 2+ on the left side  Posterior tibial pulses are 2+ on the right side, and 2+ on the left side     Pulmonary/Chest: Effort normal and breath sounds normal  No respiratory distress  She has no wheezes  She has no rales  She exhibits no tenderness  Abdominal: Soft  Bowel sounds are normal  She exhibits no distension  There is no tenderness  There is no rebound and no guarding  Musculoskeletal: Normal range of motion  She exhibits no edema or tenderness  Feet:   Right Foot:   Skin Integrity: Negative for ulcer, skin breakdown, erythema, warmth, callus or dry skin  Left Foot:   Skin Integrity: Negative for ulcer, skin breakdown, erythema, warmth, callus or dry skin  Lymphadenopathy:     She has no cervical adenopathy  Neurological: She is alert and oriented to person, place, and time  No cranial nerve deficit  She exhibits normal muscle tone  Coordination normal    Skin: Skin is warm and dry  No rash noted  She is not diaphoretic  No erythema  Psychiatric: She has a normal mood and affect  Judgment normal         Patient's shoes and socks removed  Right Foot/Ankle   Right Foot Inspection  Skin Exam: skin normal and skin intact no dry skin, no warmth, no callus, no erythema, no maceration, no abnormal color, no pre-ulcer, no ulcer and no callus                          Toe Exam: ROM and strength within normal limits  Sensory   Vibration: diminished  Proprioception: diminished   Monofilament testing: diminished  Vascular    The right DP pulse is 2+  The right PT pulse is 2+  Left Foot/Ankle  Left Foot Inspection  Skin Exam: skin normal and skin intactno dry skin, no warmth, no erythema, no maceration, normal color, no pre-ulcer, no ulcer and no callus                         Toe Exam: ROM and strength within normal limits                   Sensory   Vibration: diminished  Proprioception: diminished  Monofilament: diminished  Vascular    The left DP pulse is 2+  The left PT pulse is 2+  Assign Risk Category:  No deformity present; Loss of protective sensation;  No weak pulses       Risk: 3           Data:     Pre-operative work-up    Laboratory Results: I have personally reviewed the pertinent laboratory results/reports     EKG:  Not done    Chest x-ray:  Not done      Assessment & Recommendations:     1  Pre-op examination     2  Type 2 diabetes mellitus without complication, with long-term current use of insulin (HCC)  Microalbumin / creatinine urine ratio    IRIS Diabetic eye exam    POCT hemoglobin A1c    metFORMIN (GLUCOPHAGE) 1000 MG tablet    glipiZIDE (GLUCOTROL XL) 10 mg 24 hr tablet    CBC and differential    Comprehensive metabolic panel    Lipid panel    TSH, 3rd generation    Hemoglobin A1C   3  Essential hypertension     4  Mixed hyperlipidemia     5  Screening for breast cancer  Mammo screening bilateral w cad   6  Need for hepatitis C screening test  Hepatitis C antibody       Pre-Op Evaluation Assessment  68 y o  female with planned surgery: Moh's  surgery  Known risk factors for perioperative complications: Diabetes mellitus  Pre-Op Evaluation Plan  1  Further preoperative workup as follows:   - None; no further preoperative work-up is required    2  Medication Management/Recommendations:   - None, continue medication regimen including morning of surgery, with sip of water    3  Prophylaxis for cardiac events with perioperative beta-blockers: not indicated  4  Patient requires further consultation with: None    Clearance  Patient is CLEARED for surgery without any additional cardiac testing       Norman Pastrana MD  [facility] [address] [id number]  Phone#  [phone]  Fax#  [phone]

## 2019-08-01 ENCOUNTER — TELEPHONE (OUTPATIENT)
Dept: INTERNAL MEDICINE CLINIC | Facility: CLINIC | Age: 74
End: 2019-08-01

## 2019-09-10 ENCOUNTER — HOSPITAL ENCOUNTER (OUTPATIENT)
Dept: MAMMOGRAPHY | Facility: CLINIC | Age: 74
Discharge: HOME | End: 2019-09-10
Payer: MEDICARE

## 2019-09-10 DIAGNOSIS — E28.39 MENOPAUSE OVARIAN FAILURE: ICD-10-CM

## 2019-09-10 PROCEDURE — 77080 DXA BONE DENSITY AXIAL: CPT

## 2019-09-11 ENCOUNTER — TELEPHONE (OUTPATIENT)
Dept: INTERNAL MEDICINE CLINIC | Facility: CLINIC | Age: 74
End: 2019-09-11

## 2019-09-11 NOTE — TELEPHONE ENCOUNTER
Spoke with: daughter  Re:  DEXA scan results  Provider's message/resuts/instructions/inquiries relayed in full detal   Comments:

## 2019-09-11 NOTE — TELEPHONE ENCOUNTER
----- Message from Felipe Hernández DO sent at 9/11/2019 12:01 PM EDT -----  Call patient and let her know that her bone density testing was normal

## 2019-09-11 NOTE — TELEPHONE ENCOUNTER
----- Message from Sona Taylor DO sent at 9/11/2019 12:57 PM EDT -----  Please let the patient know their cologuard testing was negative

## 2019-09-13 DIAGNOSIS — Z79.4 TYPE 2 DIABETES MELLITUS WITHOUT COMPLICATION, WITH LONG-TERM CURRENT USE OF INSULIN (HCC): Primary | ICD-10-CM

## 2019-09-13 DIAGNOSIS — E11.9 TYPE 2 DIABETES MELLITUS WITHOUT COMPLICATION, WITH LONG-TERM CURRENT USE OF INSULIN (HCC): Primary | ICD-10-CM

## 2019-09-13 RX ORDER — BLOOD-GLUCOSE METER
KIT MISCELLANEOUS 3 TIMES DAILY
Qty: 1 EACH | Refills: 0 | Status: SHIPPED | OUTPATIENT
Start: 2019-09-13 | End: 2019-10-13

## 2019-09-13 RX ORDER — LANCETS 28 GAUGE
EACH MISCELLANEOUS
Qty: 300 EACH | Refills: 3 | Status: SHIPPED | OUTPATIENT
Start: 2019-09-13

## 2019-09-17 ENCOUNTER — APPOINTMENT (OUTPATIENT)
Dept: LAB | Facility: CLINIC | Age: 74
End: 2019-09-17
Payer: MEDICARE

## 2019-09-17 DIAGNOSIS — Z11.59 NEED FOR HEPATITIS C SCREENING TEST: ICD-10-CM

## 2019-09-17 LAB
CREAT UR-MCNC: 51.6 MG/DL
HCV AB SER QL: NORMAL
MICROALBUMIN UR-MCNC: 10.5 MG/L (ref 0–20)
MICROALBUMIN/CREAT 24H UR: 20 MG/G CREATININE (ref 0–30)

## 2019-09-17 PROCEDURE — 82043 UR ALBUMIN QUANTITATIVE: CPT | Performed by: INTERNAL MEDICINE

## 2019-09-17 PROCEDURE — 82570 ASSAY OF URINE CREATININE: CPT | Performed by: INTERNAL MEDICINE

## 2019-09-17 PROCEDURE — 36415 COLL VENOUS BLD VENIPUNCTURE: CPT

## 2019-09-17 PROCEDURE — 86803 HEPATITIS C AB TEST: CPT

## 2019-10-12 ENCOUNTER — APPOINTMENT (OUTPATIENT)
Dept: LAB | Facility: CLINIC | Age: 74
End: 2019-10-12
Payer: MEDICARE

## 2019-10-12 ENCOUNTER — TRANSCRIBE ORDERS (OUTPATIENT)
Dept: LAB | Facility: CLINIC | Age: 74
End: 2019-10-12

## 2019-10-12 DIAGNOSIS — E55.9 VITAMIN D DEFICIENCY: ICD-10-CM

## 2019-10-12 DIAGNOSIS — E11.9 TYPE 2 DIABETES MELLITUS WITHOUT COMPLICATION, WITH LONG-TERM CURRENT USE OF INSULIN (HCC): ICD-10-CM

## 2019-10-12 DIAGNOSIS — Z79.4 TYPE 2 DIABETES MELLITUS WITHOUT COMPLICATION, WITH LONG-TERM CURRENT USE OF INSULIN (HCC): ICD-10-CM

## 2019-10-12 DIAGNOSIS — J98.59 MEDIASTINAL MASS: ICD-10-CM

## 2019-10-12 DIAGNOSIS — J98.59 MEDIASTINAL MASS: Primary | ICD-10-CM

## 2019-10-12 LAB
25(OH)D3 SERPL-MCNC: 47.6 NG/ML (ref 30–100)
ALBUMIN SERPL BCP-MCNC: 4 G/DL (ref 3.5–5)
ALP SERPL-CCNC: 59 U/L (ref 46–116)
ALT SERPL W P-5'-P-CCNC: 39 U/L (ref 12–78)
ANION GAP SERPL CALCULATED.3IONS-SCNC: 7 MMOL/L (ref 4–13)
AST SERPL W P-5'-P-CCNC: 23 U/L (ref 5–45)
BASOPHILS # BLD AUTO: 0.02 THOUSANDS/ΜL (ref 0–0.1)
BASOPHILS NFR BLD AUTO: 0 % (ref 0–1)
BILIRUB SERPL-MCNC: 0.51 MG/DL (ref 0.2–1)
BUN SERPL-MCNC: 11 MG/DL (ref 5–25)
CALCIUM SERPL-MCNC: 9.3 MG/DL (ref 8.3–10.1)
CHLORIDE SERPL-SCNC: 103 MMOL/L (ref 100–108)
CHOLEST SERPL-MCNC: 122 MG/DL (ref 50–200)
CO2 SERPL-SCNC: 27 MMOL/L (ref 21–32)
CREAT SERPL-MCNC: 0.61 MG/DL (ref 0.6–1.3)
CREAT UR-MCNC: 69.4 MG/DL
EOSINOPHIL # BLD AUTO: 0.1 THOUSAND/ΜL (ref 0–0.61)
EOSINOPHIL NFR BLD AUTO: 2 % (ref 0–6)
ERYTHROCYTE [DISTWIDTH] IN BLOOD BY AUTOMATED COUNT: 14.1 % (ref 11.6–15.1)
EST. AVERAGE GLUCOSE BLD GHB EST-MCNC: 157 MG/DL
GFR SERPL CREATININE-BSD FRML MDRD: 91 ML/MIN/1.73SQ M
GLUCOSE P FAST SERPL-MCNC: 156 MG/DL (ref 65–99)
HBA1C MFR BLD: 7.1 % (ref 4.2–6.3)
HCT VFR BLD AUTO: 40.2 % (ref 34.8–46.1)
HDLC SERPL-MCNC: 45 MG/DL (ref 40–60)
HGB BLD-MCNC: 12.8 G/DL (ref 11.5–15.4)
IMM GRANULOCYTES # BLD AUTO: 0.01 THOUSAND/UL (ref 0–0.2)
IMM GRANULOCYTES NFR BLD AUTO: 0 % (ref 0–2)
LDLC SERPL CALC-MCNC: 47 MG/DL (ref 0–100)
LYMPHOCYTES # BLD AUTO: 1.29 THOUSANDS/ΜL (ref 0.6–4.47)
LYMPHOCYTES NFR BLD AUTO: 28 % (ref 14–44)
MCH RBC QN AUTO: 29.3 PG (ref 26.8–34.3)
MCHC RBC AUTO-ENTMCNC: 31.8 G/DL (ref 31.4–37.4)
MCV RBC AUTO: 92 FL (ref 82–98)
MICROALBUMIN UR-MCNC: 22.2 MG/L (ref 0–20)
MICROALBUMIN/CREAT 24H UR: 32 MG/G CREATININE (ref 0–30)
MONOCYTES # BLD AUTO: 0.38 THOUSAND/ΜL (ref 0.17–1.22)
MONOCYTES NFR BLD AUTO: 8 % (ref 4–12)
NEUTROPHILS # BLD AUTO: 2.84 THOUSANDS/ΜL (ref 1.85–7.62)
NEUTS SEG NFR BLD AUTO: 62 % (ref 43–75)
NONHDLC SERPL-MCNC: 77 MG/DL
NRBC BLD AUTO-RTO: 0 /100 WBCS
PLATELET # BLD AUTO: 256 THOUSANDS/UL (ref 149–390)
PMV BLD AUTO: 10.2 FL (ref 8.9–12.7)
POTASSIUM SERPL-SCNC: 4 MMOL/L (ref 3.5–5.3)
PROT SERPL-MCNC: 7.6 G/DL (ref 6.4–8.2)
RBC # BLD AUTO: 4.37 MILLION/UL (ref 3.81–5.12)
SODIUM SERPL-SCNC: 137 MMOL/L (ref 136–145)
TRIGL SERPL-MCNC: 151 MG/DL
TSH SERPL DL<=0.05 MIU/L-ACNC: 1.25 UIU/ML (ref 0.36–3.74)
WBC # BLD AUTO: 4.64 THOUSAND/UL (ref 4.31–10.16)

## 2019-10-12 PROCEDURE — 82570 ASSAY OF URINE CREATININE: CPT

## 2019-10-12 PROCEDURE — 80061 LIPID PANEL: CPT

## 2019-10-12 PROCEDURE — 83036 HEMOGLOBIN GLYCOSYLATED A1C: CPT

## 2019-10-12 PROCEDURE — 82043 UR ALBUMIN QUANTITATIVE: CPT

## 2019-10-12 PROCEDURE — 85025 COMPLETE CBC W/AUTO DIFF WBC: CPT

## 2019-10-12 PROCEDURE — 82306 VITAMIN D 25 HYDROXY: CPT

## 2019-10-12 PROCEDURE — 84443 ASSAY THYROID STIM HORMONE: CPT

## 2019-10-12 PROCEDURE — 36415 COLL VENOUS BLD VENIPUNCTURE: CPT

## 2019-10-12 PROCEDURE — 80053 COMPREHEN METABOLIC PANEL: CPT

## 2019-10-16 ENCOUNTER — HOSPITAL ENCOUNTER (OUTPATIENT)
Dept: CT IMAGING | Facility: HOSPITAL | Age: 74
Discharge: HOME | End: 2019-10-16
Attending: THORACIC SURGERY (CARDIOTHORACIC VASCULAR SURGERY)
Payer: MEDICARE

## 2019-10-16 DIAGNOSIS — J98.59 MEDIASTINAL MASS: ICD-10-CM

## 2019-10-16 PROCEDURE — 71260 CT THORAX DX C+: CPT

## 2019-10-16 RX ADMIN — IOHEXOL 85 ML: 350 INJECTION, SOLUTION INTRAVENOUS at 16:59

## 2019-10-20 DIAGNOSIS — I10 HYPERTENSION, UNCONTROLLED: ICD-10-CM

## 2019-10-23 ENCOUNTER — OFFICE VISIT (OUTPATIENT)
Dept: CARDIAC SURGERY | Facility: CLINIC | Age: 74
End: 2019-10-23
Payer: MEDICARE

## 2019-10-23 VITALS
HEIGHT: 62 IN | HEART RATE: 76 BPM | WEIGHT: 175.8 LBS | BODY MASS INDEX: 32.35 KG/M2 | SYSTOLIC BLOOD PRESSURE: 124 MMHG | DIASTOLIC BLOOD PRESSURE: 72 MMHG

## 2019-10-23 DIAGNOSIS — J98.59 MEDIASTINAL MASS: Primary | ICD-10-CM

## 2019-10-23 PROCEDURE — 99213 OFFICE O/P EST LOW 20 MIN: CPT | Performed by: THORACIC SURGERY (CARDIOTHORACIC VASCULAR SURGERY)

## 2019-10-23 NOTE — LETTER
October 23, 2019     Traci Muñoz MD  [address] [place] [id number]    Patient: Benita Bhatia   YOB: 1945   Date of Visit: 10/23/2019       Dear Dr Basia Meléndez:    Thank you for referring Randi Bartlett to me for evaluation  Below are my notes for this consultation  If you have questions, please do not hesitate to call me  I look forward to following your patient along with you  Sincerely,        Cesar Parish MD        CC: No Recipients  Cesar Parish MD  10/23/2019  5:32 PM  Sign at close encounter  Thoracic Follow-Up  Assessment/Plan:   61-year-old female who had an anterior mediastinal mass status post 09/07/2018 robotic assisted total thymectomy with transverse cervical incision for removal   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  She has no residual deficits from surgery  No pain  Normal voice  Overall is doing very well  Long-term she will need to be on chronic thyroid replacement medication  From a thoracic surgical standpoint no need for further follow-up  Patient would like to follow up with [facility] medical [facility] in [facility]   I provided her with copies of her operative report, pathology findings, and the most recent CT scan results  There are no diagnoses linked to this encounter  Thoracic History   Problem:  Anterior mediastinal mass    Procedure:  09/07/2018 robotic assisted total thymectomy with transverse cervical incision    Pathology:  Substernal thyroid adenoma and thymus tissue       Subjective:    Patient ID: Benita Bhatia is a 69 y o  female  HPI  Common comes back to our office approximately 1 year status post robotic assisted total thymectomy in transverse cervical incision with specimen removal   Overall she is doing well since our last visit 6 months ago  She has no further hoarseness or voice changes  She denies any chest pain or shortness of breath  No significant weight changes      Of note she had left facial skin cancer that was recently excised with a skin graft  The following portions of the patient's history were reviewed and updated as appropriate: allergies, current medications, past family history, past medical history, past social history, past surgical history and problem list     Review of Systems   Constitutional: Negative for activity change, appetite change, chills, diaphoresis, fatigue, fever and unexpected weight change  HENT: Negative  Eyes: Negative  Respiratory: Negative for apnea, cough, chest tightness, shortness of breath, wheezing and stridor  Cardiovascular: Negative for chest pain, palpitations and leg swelling  Gastrointestinal: Negative for abdominal distention, abdominal pain, blood in stool, constipation, diarrhea, nausea and vomiting  Endocrine: Negative  Genitourinary: Negative  Musculoskeletal: Negative  Skin: Negative  Allergic/Immunologic: Negative  Neurological: Negative  Hematological: Negative  Psychiatric/Behavioral: Negative  Objective:   Physical Exam   Constitutional: She is oriented to person, place, and time  She appears well-developed and well-nourished  No distress  HENT:   Head: Normocephalic and atraumatic  Mouth/Throat: Oropharynx is clear and moist  No oropharyngeal exudate  Eyes: Pupils are equal, round, and reactive to light  Conjunctivae and EOM are normal  No scleral icterus  Neck: Normal range of motion  Neck supple  No JVD present  No tracheal deviation present  No thyromegaly present  Cervical neck incision well healed  No erythema or drainage  No cervical adenopathy  Cardiovascular: Normal rate, regular rhythm, normal heart sounds and intact distal pulses  Exam reveals no gallop and no friction rub  No murmur heard  Pulmonary/Chest: Effort normal and breath sounds normal  No respiratory distress  She has no wheezes  She has no rales  She exhibits no tenderness     Normal work of breathing on room air  Robotic incisions well healed  Abdominal: Soft  Bowel sounds are normal  She exhibits no distension and no mass  There is no tenderness  There is no rebound and no guarding  Musculoskeletal: Normal range of motion  She exhibits no edema, tenderness or deformity  Neurological: She is alert and oriented to person, place, and time  Skin: Skin is warm and dry  No rash noted  She is not diaphoretic  No erythema  No pallor  Psychiatric: She has a normal mood and affect  Her behavior is normal  Judgment and thought content normal    Nursing note and vitals reviewed  /72 (BP Location: Left arm, Patient Position: Sitting, Cuff Size: Standard)   Pulse 76   Ht 5' 2" (1 575 m)   Wt 79 7 kg (175 lb 12 8 oz)   BMI 32 15 kg/m²      No Chest XR results available for this patient  Ct Chest W Contrast    Result Date: 10/17/2019  Narrative CT CHEST WITH IV CONTRAST INDICATION:   J98 59: Other diseases of mediastinum, not elsewhere classified  History of thymoma  Status post thymectomy in September 2018  COMPARISON:  CTA chest dated July 21, 2018  TECHNIQUE: CT examination of the chest was performed  Axial, sagittal, and coronal 2D reformatted images were created from the source data and submitted for interpretation  Radiation dose length product (DLP) for this visit:  216 mGy-cm   This examination, like all CT scans performed in the [facility], was performed utilizing techniques to minimize radiation dose exposure, including the use of iterative reconstruction and automated exposure control  IV Contrast:  85 mL of iohexol (OMNIPAQUE) FINDINGS: LUNGS:  There is no infiltrate or pleural effusion  There is mild subsegmental atelectasis at the medial right and left upper lobes     There is no tracheal or endobronchial lesion  PLEURA:  Unremarkable  HEART/GREAT VESSELS:  Unremarkable for patient's age   MEDIASTINUM AND ROMULO:  There is no mediastinal lymphadenopathy or soft tissue mass  A previously described soft tissue mass at the superior aspect of the anterior mediastinum extending from the sternal notch inferiorly to the manubrium is no longer visualized in this patient status post thymectomy  CHEST WALL AND LOWER NECK:   Unremarkable  VISUALIZED STRUCTURES IN THE UPPER ABDOMEN:  There is diffuse fatty infiltration of the visualized liver  The gallbladder is surgically absent  OSSEOUS STRUCTURES:  No acute fracture or destructive osseous lesion  Impression No residual soft tissue anterior mediastinal lesion in this patient status post thymectomy  No mediastinal or hilar lymphadenopathy  No infiltrate or pleural effusion  No new pulmonary nodules  Workstation performed: [id number]     I personally reviewed the patient's CT of the chest in in PACs  She has no residual mass  No nodular densities  No anterior mediastinal tissue  No cervical abnormality identified      Jose Conroy MD  Thoracic Surgeon    (Available by [facility] Text)

## 2019-10-23 NOTE — PROGRESS NOTES
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   I provided her with copies of her operative report, pathology findings, and the most recent CT scan results  There are no diagnoses linked to this encounter  Thoracic History   Problem:  Anterior mediastinal mass    Procedure:  09/07/2018 robotic assisted total thymectomy with transverse cervical incision    Pathology:  Substernal thyroid adenoma and thymus tissue       Subjective:    Patient ID: Holly Bangura is a 69 y o  female  HPI  Common comes back to our office approximately 1 year status post robotic assisted total thymectomy in transverse cervical incision with specimen removal   Overall she is doing well since our last visit 6 months ago  She has no further hoarseness or voice changes  She denies any chest pain or shortness of breath  No significant weight changes  Of note she had left facial skin cancer that was recently excised with a skin graft  The following portions of the patient's history were reviewed and updated as appropriate: allergies, current medications, past family history, past medical history, past social history, past surgical history and problem list     Review of Systems   Constitutional: Negative for activity change, appetite change, chills, diaphoresis, fatigue, fever and unexpected weight change  HENT: Negative  Eyes: Negative      Respiratory: Negative for apnea, cough, chest tightness, shortness of breath, wheezing and stridor  Cardiovascular: Negative for chest pain, palpitations and leg swelling  Gastrointestinal: Negative for abdominal distention, abdominal pain, blood in stool, constipation, diarrhea, nausea and vomiting  Endocrine: Negative  Genitourinary: Negative  Musculoskeletal: Negative  Skin: Negative  Allergic/Immunologic: Negative  Neurological: Negative  Hematological: Negative  Psychiatric/Behavioral: Negative  Objective:   Physical Exam   Constitutional: She is oriented to person, place, and time  She appears well-developed and well-nourished  No distress  HENT:   Head: Normocephalic and atraumatic  Mouth/Throat: Oropharynx is clear and moist  No oropharyngeal exudate  Eyes: Pupils are equal, round, and reactive to light  Conjunctivae and EOM are normal  No scleral icterus  Neck: Normal range of motion  Neck supple  No JVD present  No tracheal deviation present  No thyromegaly present  Cervical neck incision well healed  No erythema or drainage  No cervical adenopathy  Cardiovascular: Normal rate, regular rhythm, normal heart sounds and intact distal pulses  Exam reveals no gallop and no friction rub  No murmur heard  Pulmonary/Chest: Effort normal and breath sounds normal  No respiratory distress  She has no wheezes  She has no rales  She exhibits no tenderness  Normal work of breathing on room air  Robotic incisions well healed  Abdominal: Soft  Bowel sounds are normal  She exhibits no distension and no mass  There is no tenderness  There is no rebound and no guarding  Musculoskeletal: Normal range of motion  She exhibits no edema, tenderness or deformity  Neurological: She is alert and oriented to person, place, and time  Skin: Skin is warm and dry  No rash noted  She is not diaphoretic  No erythema  No pallor  Psychiatric: She has a normal mood and affect   Her behavior is normal  Judgment and thought content normal  Nursing note and vitals reviewed  /72 (BP Location: Left arm, Patient Position: Sitting, Cuff Size: Standard)   Pulse 76   Ht 5' 2" (1 575 m)   Wt 79 7 kg (175 lb 12 8 oz)   BMI 32 15 kg/m²     No Chest XR results available for this patient  Ct Chest W Contrast    Result Date: 10/17/2019  Narrative CT CHEST WITH IV CONTRAST INDICATION:   J98 59: Other diseases of mediastinum, not elsewhere classified  History of thymoma  Status post thymectomy in September 2018  COMPARISON:  CTA chest dated July 21, 2018  TECHNIQUE: CT examination of the chest was performed  Axial, sagittal, and coronal 2D reformatted images were created from the source data and submitted for interpretation  Radiation dose length product (DLP) for this visit:  216 mGy-cm   This examination, like all CT scans performed in the [facility], was performed utilizing techniques to minimize radiation dose exposure, including the use of iterative reconstruction and automated exposure control  IV Contrast:  85 mL of iohexol (OMNIPAQUE) FINDINGS: LUNGS:  There is no infiltrate or pleural effusion  There is mild subsegmental atelectasis at the medial right and left upper lobes     There is no tracheal or endobronchial lesion  PLEURA:  Unremarkable  HEART/GREAT VESSELS:  Unremarkable for patient's age  MEDIASTINUM AND ROMULO:  There is no mediastinal lymphadenopathy or soft tissue mass  A previously described soft tissue mass at the superior aspect of the anterior mediastinum extending from the sternal notch inferiorly to the manubrium is no longer visualized in this patient status post thymectomy  CHEST WALL AND LOWER NECK:   Unremarkable  VISUALIZED STRUCTURES IN THE UPPER ABDOMEN:  There is diffuse fatty infiltration of the visualized liver  The gallbladder is surgically absent  OSSEOUS STRUCTURES:  No acute fracture or destructive osseous lesion       Impression No residual soft tissue anterior mediastinal lesion in this patient status post thymectomy  No mediastinal or hilar lymphadenopathy  No infiltrate or pleural effusion  No new pulmonary nodules  Workstation performed: [id number]     I personally reviewed the patient's CT of the chest in in PACs  She has no residual mass  No nodular densities  No anterior mediastinal tissue  No cervical abnormality identified      Fritz Melendrez MD  Thoracic Surgeon    (Available by [facility] Text)

## 2019-10-24 ENCOUNTER — HOSPITAL ENCOUNTER (OUTPATIENT)
Dept: MAMMOGRAPHY | Facility: CLINIC | Age: 74
Discharge: HOME | End: 2019-10-24
Payer: MEDICARE

## 2019-10-24 VITALS — WEIGHT: 175 LBS | BODY MASS INDEX: 32.2 KG/M2 | HEIGHT: 62 IN

## 2019-10-24 DIAGNOSIS — Z12.39 SCREENING FOR BREAST CANCER: ICD-10-CM

## 2019-10-24 PROCEDURE — 77067 SCR MAMMO BI INCL CAD: CPT

## 2019-11-05 ENCOUNTER — OFFICE VISIT (OUTPATIENT)
Dept: INTERNAL MEDICINE CLINIC | Facility: CLINIC | Age: 74
End: 2019-11-05
Payer: MEDICARE

## 2019-11-05 VITALS
OXYGEN SATURATION: 98 % | HEART RATE: 81 BPM | HEIGHT: 62 IN | SYSTOLIC BLOOD PRESSURE: 150 MMHG | BODY MASS INDEX: 32.39 KG/M2 | TEMPERATURE: 97.8 F | DIASTOLIC BLOOD PRESSURE: 72 MMHG | WEIGHT: 176 LBS

## 2019-11-05 DIAGNOSIS — J30.89 ALLERGIC RHINITIS DUE TO OTHER ALLERGIC TRIGGER, UNSPECIFIED SEASONALITY: ICD-10-CM

## 2019-11-05 DIAGNOSIS — E11.9 TYPE 2 DIABETES MELLITUS WITHOUT COMPLICATION, WITH LONG-TERM CURRENT USE OF INSULIN (HCC): ICD-10-CM

## 2019-11-05 DIAGNOSIS — E11.319 CONTROLLED TYPE 2 DIABETES MELLITUS WITH RETINOPATHY WITHOUT MACULAR EDEMA, WITH LONG-TERM CURRENT USE OF INSULIN, UNSPECIFIED LATERALITY, UNSPECIFIED RETINOPATHY SEVERITY (HCC): ICD-10-CM

## 2019-11-05 DIAGNOSIS — G89.29 CHRONIC RIGHT SHOULDER PAIN: ICD-10-CM

## 2019-11-05 DIAGNOSIS — Z23 NEED FOR INFLUENZA VACCINATION: ICD-10-CM

## 2019-11-05 DIAGNOSIS — M25.511 CHRONIC RIGHT SHOULDER PAIN: ICD-10-CM

## 2019-11-05 DIAGNOSIS — K21.9 GASTROESOPHAGEAL REFLUX DISEASE WITHOUT ESOPHAGITIS: Primary | ICD-10-CM

## 2019-11-05 DIAGNOSIS — I10 HYPERTENSION, UNCONTROLLED: ICD-10-CM

## 2019-11-05 DIAGNOSIS — Z79.4 TYPE 2 DIABETES MELLITUS WITHOUT COMPLICATION, WITH LONG-TERM CURRENT USE OF INSULIN (HCC): ICD-10-CM

## 2019-11-05 DIAGNOSIS — Z79.4 CONTROLLED TYPE 2 DIABETES MELLITUS WITH RETINOPATHY WITHOUT MACULAR EDEMA, WITH LONG-TERM CURRENT USE OF INSULIN, UNSPECIFIED LATERALITY, UNSPECIFIED RETINOPATHY SEVERITY (HCC): ICD-10-CM

## 2019-11-05 PROCEDURE — G0008 ADMIN INFLUENZA VIRUS VAC: HCPCS | Performed by: INTERNAL MEDICINE

## 2019-11-05 PROCEDURE — 90662 IIV NO PRSV INCREASED AG IM: CPT | Performed by: INTERNAL MEDICINE

## 2019-11-05 PROCEDURE — 99214 OFFICE O/P EST MOD 30 MIN: CPT | Performed by: INTERNAL MEDICINE

## 2019-11-05 RX ORDER — GLIPIZIDE 10 MG/1
10 TABLET, FILM COATED, EXTENDED RELEASE ORAL DAILY
Qty: 90 TABLET | Refills: 3 | Status: SHIPPED | OUTPATIENT
Start: 2019-11-05

## 2019-11-05 RX ORDER — CETIRIZINE HYDROCHLORIDE 5 MG/1
5 TABLET ORAL DAILY
Qty: 90 TABLET | Refills: 3 | Status: SHIPPED | OUTPATIENT
Start: 2019-11-05 | End: 2020-02-03

## 2019-11-05 RX ORDER — LEVOTHYROXINE SODIUM 0.12 MG/1
125 TABLET ORAL DAILY
Qty: 90 TABLET | Refills: 3 | Status: SHIPPED | OUTPATIENT
Start: 2019-11-05

## 2019-11-05 RX ORDER — OMEPRAZOLE 20 MG/1
20 CAPSULE, DELAYED RELEASE ORAL
Qty: 90 CAPSULE | Refills: 3 | Status: SHIPPED | OUTPATIENT
Start: 2019-11-05

## 2019-11-05 RX ORDER — LOSARTAN POTASSIUM 100 MG/1
100 TABLET ORAL DAILY
Qty: 90 TABLET | Refills: 3 | Status: SHIPPED | OUTPATIENT
Start: 2019-11-05

## 2019-11-05 NOTE — PROGRESS NOTES
Assessment/Plan: she is doing well physical exam unremarkable I reviewed her labs with her I refilled all of her meds       Diagnoses and all orders for this visit:    Gastroesophageal reflux disease without esophagitis  -     omeprazole (PriLOSEC) 20 mg delayed release capsule; Take 1 capsule (20 mg total) by mouth daily before breakfast    Type 2 diabetes mellitus without complication, with long-term current use of insulin (HCC)  -     glipiZIDE (GLUCOTROL XL) 10 mg 24 hr tablet; Take 1 tablet (10 mg total) by mouth daily  -     metFORMIN (GLUCOPHAGE) 1000 MG tablet; Take 1 tablet (1,000 mg total) by mouth 2 (two) times a day with meals    Chronic right shoulder pain  -     levothyroxine 125 mcg tablet; Take 1 tablet (125 mcg total) by mouth daily    Hypertension, uncontrolled  -     losartan (COZAAR) 100 MG tablet; Take 1 tablet (100 mg total) by mouth daily    Controlled type 2 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)    Allergic rhinitis due to other allergic trigger, unspecified seasonality  -     cetirizine (ZyrTEC) 5 MG tablet; Take 1 tablet (5 mg total) by mouth daily    Need for influenza vaccination  -     influenza vaccine, [id number], high-dose, PF 0 5 mL (FLUZONE HIGH-DOSE)        No problem-specific Assessment & Plan notes found for this encounter  Subjective:      Patient ID: Sarita Coker is a 69 y o  female  She is here for follow-up she is going to [place] for months or maybe more and she would like all of her medications refilled recently had a benign thyroid mass removed and a skin cancer removed from her face she is up and around ambulatory feeling well except for some arthritis affecting her shoulders and elbows    History of diabetes most recent A1c is 7 hyperlipidemia hypertension well controlled with meds no chest pain shortness of breath palpitations dizziness nausea vomiting      The following portions of the patient's history were reviewed and updated as appropriate:   She has a past medical history of Anxiety, Diabetes mellitus ([address] ), Disease of thyroid gland, Hyperlipidemia, Hypertension, Mediastinal mass, and Right shoulder pain  ,  does not have any pertinent problems on file  ,   has a past surgical history that includes Knee surgery; Appendectomy; Cholecystectomy; Thyroid surgery; Eye surgery; Thymectomy (Left, 9/7/2018); and Thymectomy (N/A, 9/7/2018)  ,  family history includes Cancer in her father; Diabetes in her brother; Heart disease in her father and mother  ,   reports that she quit smoking about 32 years ago  She has a 10 00 pack-year smoking history  She has never used smokeless tobacco  She reports that she does not drink alcohol or use drugs  ,  has No Known Allergies     Current Outpatient Medications   Medication Sig Dispense Refill    aspirin 81 mg chewable tablet Chew 81 mg daily      atorvastatin (LIPITOR) 20 mg tablet Take 1 tablet (20 mg total) by mouth daily 90 tablet 3    cyanocobalamin (VITAMIN B-12) 500 mcg tablet Take 500 mcg by mouth daily  1    glipiZIDE (GLUCOTROL XL) 10 mg 24 hr tablet Take 1 tablet (10 mg total) by mouth daily 90 tablet 3    glucose blood (FREESTYLE LITE) test strip PT TO TEST BLOOD SUGAR TID DX:E11 9 300 each 3    Lancets (FREESTYLE) lancets PT TO TEST BLOOD SUGAR TID DX :E11 9 300 each 3    levothyroxine 125 mcg tablet Take 1 tablet (125 mcg total) by mouth daily 90 tablet 3    losartan (COZAAR) 100 MG tablet Take 1 tablet (100 mg total) by mouth daily 90 tablet 3    metFORMIN (GLUCOPHAGE) 1000 MG tablet Take 1 tablet (1,000 mg total) by mouth 2 (two) times a day with meals 180 tablet 3    Blood Glucose Monitoring Suppl (FREESTYLE FREEDOM LITE) w/Device KIT by Does not apply route 3 (three) times a day PT TO TEST BLOOD SUGAR TID DX: E11 9 1 each 0    cetirizine (ZyrTEC) 5 MG tablet Take 1 tablet (5 mg total) by mouth daily 90 tablet 3    omeprazole (PriLOSEC) 20 mg delayed release capsule Take 1 capsule (20 mg total) by mouth daily before breakfast 90 capsule 3     No current facility-administered medications for this visit  Review of Systems   Constitutional: Negative for activity change, appetite change, chills, diaphoresis, fatigue, fever and unexpected weight change  HENT: Negative for congestion, dental problem, drooling, ear discharge, ear pain, facial swelling, hearing loss, nosebleeds, postnasal drip, rhinorrhea, sinus pressure, sinus pain, sneezing, sore throat, tinnitus, trouble swallowing and voice change  Eyes: Negative for photophobia, pain, discharge, redness, itching and visual disturbance  Respiratory: Negative for apnea, cough, choking, chest tightness, shortness of breath, wheezing and stridor  Cardiovascular: Negative for chest pain, palpitations and leg swelling  Gastrointestinal: Negative for abdominal distention, abdominal pain, anal bleeding, blood in stool, constipation, diarrhea, nausea, rectal pain and vomiting  Endocrine: Negative for cold intolerance, heat intolerance, polydipsia, polyphagia and polyuria  Genitourinary: Negative for decreased urine volume, difficulty urinating, dysuria, enuresis, flank pain, frequency, genital sores, hematuria and urgency  Musculoskeletal: Positive for arthralgias  Negative for back pain, gait problem, joint swelling, myalgias, neck pain and neck stiffness  Skin: Negative for color change, pallor, rash and wound  Neurological: Negative for dizziness, tremors, seizures, syncope, facial asymmetry, speech difficulty, weakness, light-headedness, numbness and headaches  Hematological: Negative for adenopathy  Does not bruise/bleed easily     Psychiatric/Behavioral: Negative for agitation, behavioral problems, confusion, decreased concentration, dysphoric mood, hallucinations, self-injury, sleep disturbance and suicidal ideas  The patient is not nervous/anxious and is not hyperactive  Objective:  Vitals:    11/05/19 1604   BP: 150/72   BP Location: Left arm   Patient Position: Sitting   Cuff Size: Standard   Pulse: 81   Temp: 97 8 °F (36 6 °C)   SpO2: 98%   Weight: 79 8 kg (176 lb)   Height: 5' 2" (1 575 m)     Body mass index is 32 19 kg/m²  Physical Exam   Constitutional: She is oriented to person, place, and time  She appears well-developed  HENT:   Head: Normocephalic  Right Ear: External ear normal    Left Ear: External ear normal    Mouth/Throat: Oropharynx is clear and moist    Eyes: Pupils are equal, round, and reactive to light  Conjunctivae are normal    Neck: Neck supple  No JVD present  No thyromegaly present  Cardiovascular: Normal rate, regular rhythm, normal heart sounds and intact distal pulses  No murmur heard  Pulmonary/Chest: Effort normal and breath sounds normal  No stridor  No respiratory distress  She has no wheezes  She has no rales  She exhibits no tenderness  Abdominal: Soft  Bowel sounds are normal  She exhibits no distension  Musculoskeletal: Normal range of motion  She exhibits tenderness ( limited motion both shoulders due to pain)  She exhibits no edema or deformity  Lymphadenopathy:     She has no cervical adenopathy  Neurological: She is alert and oriented to person, place, and time  She has normal reflexes  Skin: Skin is warm and dry  Thymectomy scar scar left face adjacent to her nose   Vitals reviewed

## 2019-11-14 ENCOUNTER — TELEPHONE (OUTPATIENT)
Dept: INTERNAL MEDICINE CLINIC | Facility: CLINIC | Age: 74
End: 2019-11-14
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
## 2019-11-15 DIAGNOSIS — Z79.4 TYPE 2 DIABETES MELLITUS WITHOUT COMPLICATION, WITH LONG-TERM CURRENT USE OF INSULIN (HCC): Primary | ICD-10-CM

## 2019-11-15 DIAGNOSIS — E11.9 TYPE 2 DIABETES MELLITUS WITHOUT COMPLICATION, WITH LONG-TERM CURRENT USE OF INSULIN (HCC): Primary | ICD-10-CM

## 2019-11-15 NOTE — TELEPHONE ENCOUNTER
It should not be that high if she is taking the glipizide with lunch  Please ask the patient if that is what she is doing    Otherwise the only option is to  add a new medication

## 2019-12-05 RX ORDER — LOSARTAN POTASSIUM 100 MG/1
TABLET ORAL
Qty: 90 TABLET | Refills: 3 | OUTPATIENT
Start: 2019-12-05

## (undated) DEVICE — SUT VICRYL 2-0 SH 27 IN UNDYED J417H

## (undated) DEVICE — SUT SILK 2-0 18 IN A185H

## (undated) DEVICE — JP CHANNEL DRAIN, 24FR HUBLESS: Brand: CARDINAL HEALTH

## (undated) DEVICE — TELFA NON-ADHERENT ABSORBENT DRESSING: Brand: TELFA

## (undated) DEVICE — VESSEL LOOPS X-RAY DETECTABLE: Brand: DEROYAL

## (undated) DEVICE — SYRINGE 10ML LL

## (undated) DEVICE — GLOVE SRG BIOGEL ECLIPSE 7.5

## (undated) DEVICE — STERILE THORACIC PACK: Brand: CARDINAL HEALTH

## (undated) DEVICE — IRRIG ENDO FLO TUBING

## (undated) DEVICE — ENDOPATH XCEL BLADELESS TROCARS WITH STABILITY SLEEVES: Brand: ENDOPATH XCEL

## (undated) DEVICE — DISPOSABLE GRASPER CARTRIDGE: Brand: DIRECT DRIVE REPOSABLE GRASPERS

## (undated) DEVICE — CHLORAPREP HI-LITE 26ML ORANGE

## (undated) DEVICE — 3M™ STERI-STRIP™ REINFORCED ADHESIVE SKIN CLOSURES, R1547, 1/2 IN X 4 IN (12 MM X 100 MM), 6 STRIPS/ENVELOPE: Brand: 3M™ STERI-STRIP™

## (undated) DEVICE — ANTI-FOG SOLUTION WITH FOAM PAD: Brand: DEVON

## (undated) DEVICE — JACKSON-PRATT 100CC BULB RESERVOIR: Brand: CARDINAL HEALTH

## (undated) DEVICE — DRAPE SHEET X-LG

## (undated) DEVICE — INTENDED FOR TISSUE SEPARATION, AND OTHER PROCEDURES THAT REQUIRE A SHARP SURGICAL BLADE TO PUNCTURE OR CUT.: Brand: BARD-PARKER SAFETY BLADES SIZE 11, STERILE

## (undated) DEVICE — SUT SILK 3-0 18 IN A184H

## (undated) DEVICE — HEAVY DUTY TABLE COVER: Brand: CONVERTORS

## (undated) DEVICE — INSUFLATION TUBING INSUFLOW (LEXION)

## (undated) DEVICE — SUT VICRYL 0 CT-1 18 IN J740D

## (undated) DEVICE — Device: Brand: TISSUE RETRIEVAL SYSTEM

## (undated) DEVICE — 3M™ TEGADERM™ TRANSPARENT FILM DRESSING FRAME STYLE, 1624W, 2-3/8 IN X 2-3/4 IN (6 CM X 7 CM), 100/CT 4CT/CASE: Brand: 3M™ TEGADERM™

## (undated) DEVICE — STRL COTTON TIP APPLCTR 6IN PK: Brand: CARDINAL HEALTH

## (undated) DEVICE — ELECTRODE BLADE MOD E-Z CLEAN 2.5IN 6.4CM -0012M

## (undated) DEVICE — COLUMN DRAPE

## (undated) DEVICE — SUT VICRYL 3-0 SH 27 IN J416H

## (undated) DEVICE — MARYLAND BIPOLAR FORCEPS: Brand: ENDOWRIST

## (undated) DEVICE — ADHESIVE SKN CLSR HISTOACRYL FLEX 0.5ML LF

## (undated) DEVICE — 3000CC GUARDIAN II: Brand: GUARDIAN

## (undated) DEVICE — SUT PROLENE 0 CT-1 30 IN 8424H

## (undated) DEVICE — CANNULA SEAL

## (undated) DEVICE — ARM DRAPE

## (undated) DEVICE — SUT MONOCRYL 4-0 PS-2 27 IN Y426H

## (undated) DEVICE — LUBRICANT INST ELECTROLUBE ANTISTK WO PAD

## (undated) DEVICE — DRAPE FLUID WARMER (BIRD BATH)

## (undated) DEVICE — INTENDED FOR TISSUE SEPARATION, AND OTHER PROCEDURES THAT REQUIRE A SHARP SURGICAL BLADE TO PUNCTURE OR CUT.: Brand: BARD-PARKER SAFETY BLADES SIZE 15, STERILE

## (undated) DEVICE — CADIERE FORCEPS: Brand: ENDOWRIST

## (undated) DEVICE — CLAMP TOWEL TUBING DISPOSABLE